# Patient Record
Sex: FEMALE | Race: WHITE | NOT HISPANIC OR LATINO
[De-identification: names, ages, dates, MRNs, and addresses within clinical notes are randomized per-mention and may not be internally consistent; named-entity substitution may affect disease eponyms.]

---

## 2017-05-03 ENCOUNTER — APPOINTMENT (OUTPATIENT)
Dept: ORTHOPEDIC SURGERY | Facility: CLINIC | Age: 72
End: 2017-05-03

## 2017-05-03 DIAGNOSIS — M17.0 BILATERAL PRIMARY OSTEOARTHRITIS OF KNEE: ICD-10-CM

## 2019-02-17 ENCOUNTER — EMERGENCY (EMERGENCY)
Facility: HOSPITAL | Age: 74
LOS: 1 days | Discharge: HOME | End: 2019-02-17
Attending: EMERGENCY MEDICINE | Admitting: EMERGENCY MEDICINE
Payer: MEDICARE

## 2019-02-17 VITALS
DIASTOLIC BLOOD PRESSURE: 88 MMHG | SYSTOLIC BLOOD PRESSURE: 145 MMHG | HEART RATE: 62 BPM | OXYGEN SATURATION: 100 % | RESPIRATION RATE: 16 BRPM

## 2019-02-17 VITALS
OXYGEN SATURATION: 99 % | HEART RATE: 107 BPM | RESPIRATION RATE: 18 BRPM | DIASTOLIC BLOOD PRESSURE: 106 MMHG | SYSTOLIC BLOOD PRESSURE: 160 MMHG | TEMPERATURE: 98 F

## 2019-02-17 PROCEDURE — 99283 EMERGENCY DEPT VISIT LOW MDM: CPT | Mod: GC

## 2019-02-17 RX ORDER — DIPHENHYDRAMINE HCL 50 MG
1 CAPSULE ORAL
Qty: 30 | Refills: 0
Start: 2019-02-17

## 2019-02-17 RX ORDER — DIPHENHYDRAMINE HCL 50 MG
50 CAPSULE ORAL ONCE
Qty: 0 | Refills: 0 | Status: COMPLETED | OUTPATIENT
Start: 2019-02-17 | End: 2019-02-17

## 2019-02-17 RX ORDER — FAMOTIDINE 10 MG/ML
20 INJECTION INTRAVENOUS DAILY
Qty: 0 | Refills: 0 | Status: DISCONTINUED | OUTPATIENT
Start: 2019-02-17 | End: 2019-02-21

## 2019-02-17 RX ADMIN — FAMOTIDINE 20 MILLIGRAM(S): 10 INJECTION INTRAVENOUS at 09:48

## 2019-02-17 RX ADMIN — Medication 50 MILLIGRAM(S): at 09:47

## 2019-02-17 NOTE — ED ADULT TRIAGE NOTE - CHIEF COMPLAINT QUOTE
pt amb to triage c/o facial swelling, allg to PCNS denies taking, able to complete sentences, no resp distress noted, swelling noted on presentation, denies taking ace inhibitors

## 2019-02-17 NOTE — ED PROVIDER NOTE - ENMT, MLM
Airway patent, Nasal mucosa clear. Mouth with normal mucosa. Throat has no vesicles, no oropharyngeal exudates and uvula is midline. + lower and b/l cheek swelling

## 2019-02-17 NOTE — ED PROVIDER NOTE - CLINICAL SUMMARY MEDICAL DECISION MAKING FREE TEXT BOX
73F hx reflux, presenting w/ facial and lower lip swelling since 5AM this morning, concern for allergic reaction, no known risk factors for angioedema, will tx as allergy and watch and if no improvement will consider ENT eval 73F hx reflux, presenting w/ facial and lower lip swelling since 5AM this morning, concern for allergic reaction, possible medication side effect, will tx as allergy and watch and if no improvement will consider ENT eval

## 2019-02-17 NOTE — ED ADULT NURSE NOTE - EENT WDL
+ facial and lip swelling, no tongue swelling, no redness, No tenderness or swelling to throat or neck.

## 2019-02-17 NOTE — ED ADULT NURSE NOTE - NSIMPLEMENTINTERV_GEN_ALL_ED
Implemented All Universal Safety Interventions:  [place] to call system. Call bell, personal items and telephone within reach. Instruct patient to call for assistance. Room bathroom lighting operational. Non-slip footwear when patient is off stretcher. Physically safe environment: no spills, clutter or unnecessary equipment. Stretcher in lowest position, wheels locked, appropriate side rails in place.

## 2019-02-17 NOTE — ED PROVIDER NOTE - NSFOLLOWUPINSTRUCTIONS_ED_ALL_ED_FT
Rest and plenty of fluids.  Continue Prednisone 40mg daily.  May take Benadryl 25mg every 6 hours as needed for swelling.  Follow up with Allergist as discussed.

## 2019-02-17 NOTE — ED ADULT NURSE NOTE - NSFALLRSKASSESSDT_ED_ALL_ED
Pharmacy calling stating script for muscle relaxant from UC visit not coming through yet, reviewed in epic and see it notes something about PA awaiting response? Gave verbal order as written in epic to pharmacist. No Triage required.    Jennifer Lennon RN, BSN  [facility]    
17-Feb-2019 12:40

## 2019-02-17 NOTE — ED PROVIDER NOTE - PSH
Bilateral Cataracts repair 7 yrs ago    H/O: Hysterectomy - 1997    History of Colonoscopy and endoscopy May 2011    right ankle ORIF - 2009

## 2019-02-17 NOTE — ED PROVIDER NOTE - PROGRESS NOTE DETAILS
Oh PGY2: pt reassessed, left sided facial swelling improved. stable condition, no new symptoms, will continue to monitor Dr. Wright: Pt states feeling better. Swelling improved. Tolerating PO with no difficulty breathing. Advised to f/u with Allergist and given list. Sent Prednisone and Benadryl to pharmacy.

## 2019-02-17 NOTE — ED ADULT NURSE NOTE - OBJECTIVE STATEMENT
73F hx reflux, presenting w/ facial and lower lip swelling since 5AM this morning, states she was normal yesterday. No new foods, allergens or exposures. No sick contacts. Pt denies any difficulty breathing. Denies pain or difficult swallowing or tongue swelling but did notice with the swelling of her face that it was hard to intake food. No chest pain, sob, nausea, vomiting. No fevers/ chills. No travel. No pruritis/ skin changes.

## 2019-02-17 NOTE — ED PROVIDER NOTE - ATTENDING CONTRIBUTION TO CARE
Pt was seen and evaluated by me. Pt is a 72 y/o female with PMHx of reflux who presented to the ED for facial swelling today. Pt states she had some wine and chicken last night and then woke up today at 5am with swelling to lower face and lips. Pt denies any pain. Denies any difficulty swallowing or SOB. Pt has since had coffee and a jelly donut without any difficulty. Pt denies any headache, fever, chills, nausea, vomiting, chest pain, or abd pain. Lungs CTA b/l. RRR. Abd soft, non-tender. No tongue swelling. Uvula midline. Noted to have lower facial and lower lip swelling.

## 2019-02-17 NOTE — ED PROVIDER NOTE - OBJECTIVE STATEMENT
73F hx reflux, presenting w/ facial and lower lip swelling since 5AM this morning, states she was normal yesterday. Denies any difficulty breathing. Denies pain or difficult swallowing or tongue swelling but did notice with the swelling of her face that it was hard to intake food. no chest pain, sob, nausea, vomiting 73F hx reflux, presenting w/ facial and lower lip swelling since 5AM this morning, states she was normal yesterday. No new foods, allergens or exposures. No sick contacts. Pt denies any difficulty breathing. Denies pain or difficult swallowing or tongue swelling but did notice with the swelling of her face that it was hard to intake food. No chest pain, sob, nausea, vomiting. No fevers/ chills. No travel. No pruritis/ skin changes. 73F hx reflux, presenting w/ facial and lower lip swelling since 5AM this morning, states she was normal yesterday. No new foods, allergens or exposures. No sick contacts. Pt denies any difficulty breathing. Denies pain or difficult swallowing or tongue swelling but did notice with the swelling of her face that it was hard to intake food. No chest pain, sob, nausea, vomiting. No fevers/ chills. No travel. No pruritis/ skin changes. Home meds include denosumab and rabeprazole (not new meds)

## 2020-03-22 ENCOUNTER — INPATIENT (INPATIENT)
Facility: HOSPITAL | Age: 75
LOS: 1 days | Discharge: NURSING, REHAB OR LONG-TERM CARE | End: 2020-03-24
Attending: ORTHOPAEDIC SURGERY | Admitting: ORTHOPAEDIC SURGERY
Payer: MEDICARE

## 2020-03-22 ENCOUNTER — TRANSCRIPTION ENCOUNTER (OUTPATIENT)
Age: 75
End: 2020-03-22

## 2020-03-22 VITALS
TEMPERATURE: 99 F | RESPIRATION RATE: 18 BRPM | HEART RATE: 74 BPM | OXYGEN SATURATION: 99 % | DIASTOLIC BLOOD PRESSURE: 89 MMHG | SYSTOLIC BLOOD PRESSURE: 172 MMHG

## 2020-03-22 DIAGNOSIS — Z01.818 ENCOUNTER FOR OTHER PREPROCEDURAL EXAMINATION: ICD-10-CM

## 2020-03-22 DIAGNOSIS — S72.002A FRACTURE OF UNSPECIFIED PART OF NECK OF LEFT FEMUR, INITIAL ENCOUNTER FOR CLOSED FRACTURE: ICD-10-CM

## 2020-03-22 LAB
ALBUMIN SERPL ELPH-MCNC: 4.1 G/DL — SIGNIFICANT CHANGE UP (ref 3.3–5)
ALP SERPL-CCNC: 86 U/L — SIGNIFICANT CHANGE UP (ref 40–120)
ALT FLD-CCNC: 17 U/L — SIGNIFICANT CHANGE UP (ref 4–33)
ANION GAP SERPL CALC-SCNC: 12 MMO/L — SIGNIFICANT CHANGE UP (ref 7–14)
APPEARANCE UR: CLEAR — SIGNIFICANT CHANGE UP
APTT BLD: 31.6 SEC — SIGNIFICANT CHANGE UP (ref 27.5–36.3)
AST SERPL-CCNC: 20 U/L — SIGNIFICANT CHANGE UP (ref 4–32)
BACTERIA # UR AUTO: HIGH
BILIRUB SERPL-MCNC: 0.3 MG/DL — SIGNIFICANT CHANGE UP (ref 0.2–1.2)
BILIRUB UR-MCNC: NEGATIVE — SIGNIFICANT CHANGE UP
BLD GP AB SCN SERPL QL: NEGATIVE — SIGNIFICANT CHANGE UP
BLOOD UR QL VISUAL: HIGH
BUN SERPL-MCNC: 20 MG/DL — SIGNIFICANT CHANGE UP (ref 7–23)
CALCIUM SERPL-MCNC: 9.6 MG/DL — SIGNIFICANT CHANGE UP (ref 8.4–10.5)
CHLORIDE SERPL-SCNC: 98 MMOL/L — SIGNIFICANT CHANGE UP (ref 98–107)
CO2 SERPL-SCNC: 24 MMOL/L — SIGNIFICANT CHANGE UP (ref 22–31)
COLOR SPEC: YELLOW — SIGNIFICANT CHANGE UP
CREAT SERPL-MCNC: 0.59 MG/DL — SIGNIFICANT CHANGE UP (ref 0.5–1.3)
GLUCOSE SERPL-MCNC: 97 MG/DL — SIGNIFICANT CHANGE UP (ref 70–99)
GLUCOSE UR-MCNC: NEGATIVE — SIGNIFICANT CHANGE UP
HCT VFR BLD CALC: 43.8 % — SIGNIFICANT CHANGE UP (ref 34.5–45)
HGB BLD-MCNC: 14.3 G/DL — SIGNIFICANT CHANGE UP (ref 11.5–15.5)
HYALINE CASTS # UR AUTO: NEGATIVE — SIGNIFICANT CHANGE UP
INR BLD: 1.03 — SIGNIFICANT CHANGE UP (ref 0.88–1.17)
KETONES UR-MCNC: SIGNIFICANT CHANGE UP
LEUKOCYTE ESTERASE UR-ACNC: SIGNIFICANT CHANGE UP
MCHC RBC-ENTMCNC: 29.3 PG — SIGNIFICANT CHANGE UP (ref 27–34)
MCHC RBC-ENTMCNC: 32.6 % — SIGNIFICANT CHANGE UP (ref 32–36)
MCV RBC AUTO: 89.8 FL — SIGNIFICANT CHANGE UP (ref 80–100)
NITRITE UR-MCNC: POSITIVE — HIGH
NRBC # FLD: 0 K/UL — SIGNIFICANT CHANGE UP (ref 0–0)
PH UR: 5.5 — SIGNIFICANT CHANGE UP (ref 5–8)
PLATELET # BLD AUTO: 285 K/UL — SIGNIFICANT CHANGE UP (ref 150–400)
PMV BLD: 10.8 FL — SIGNIFICANT CHANGE UP (ref 7–13)
POTASSIUM SERPL-MCNC: 4.2 MMOL/L — SIGNIFICANT CHANGE UP (ref 3.5–5.3)
POTASSIUM SERPL-SCNC: 4.2 MMOL/L — SIGNIFICANT CHANGE UP (ref 3.5–5.3)
PROT SERPL-MCNC: 7.9 G/DL — SIGNIFICANT CHANGE UP (ref 6–8.3)
PROT UR-MCNC: 10 — SIGNIFICANT CHANGE UP
PROTHROM AB SERPL-ACNC: 11.8 SEC — SIGNIFICANT CHANGE UP (ref 9.8–13.1)
RBC # BLD: 4.88 M/UL — SIGNIFICANT CHANGE UP (ref 3.8–5.2)
RBC # FLD: 13.2 % — SIGNIFICANT CHANGE UP (ref 10.3–14.5)
RBC CASTS # UR COMP ASSIST: HIGH (ref 0–?)
RH IG SCN BLD-IMP: POSITIVE — SIGNIFICANT CHANGE UP
SODIUM SERPL-SCNC: 134 MMOL/L — LOW (ref 135–145)
SP GR SPEC: 1.02 — SIGNIFICANT CHANGE UP (ref 1–1.04)
SQUAMOUS # UR AUTO: SIGNIFICANT CHANGE UP
UROBILINOGEN FLD QL: NORMAL — SIGNIFICANT CHANGE UP
WBC # BLD: 8.02 K/UL — SIGNIFICANT CHANGE UP (ref 3.8–10.5)
WBC # FLD AUTO: 8.02 K/UL — SIGNIFICANT CHANGE UP (ref 3.8–10.5)
WBC UR QL: HIGH (ref 0–?)

## 2020-03-22 PROCEDURE — 99221 1ST HOSP IP/OBS SF/LOW 40: CPT

## 2020-03-22 PROCEDURE — 73502 X-RAY EXAM HIP UNI 2-3 VIEWS: CPT | Mod: 26,LT

## 2020-03-22 PROCEDURE — 73501 X-RAY EXAM HIP UNI 1 VIEW: CPT | Mod: 26,59,LT

## 2020-03-22 PROCEDURE — 71046 X-RAY EXAM CHEST 2 VIEWS: CPT | Mod: 26

## 2020-03-22 PROCEDURE — 73552 X-RAY EXAM OF FEMUR 2/>: CPT | Mod: 26,LT

## 2020-03-22 RX ORDER — MAGNESIUM HYDROXIDE 400 MG/1
30 TABLET, CHEWABLE ORAL DAILY
Refills: 0 | Status: DISCONTINUED | OUTPATIENT
Start: 2020-03-22 | End: 2020-03-24

## 2020-03-22 RX ORDER — TETANUS TOXOID, REDUCED DIPHTHERIA TOXOID AND ACELLULAR PERTUSSIS VACCINE, ADSORBED 5; 2.5; 8; 8; 2.5 [IU]/.5ML; [IU]/.5ML; UG/.5ML; UG/.5ML; UG/.5ML
0.5 SUSPENSION INTRAMUSCULAR ONCE
Refills: 0 | Status: COMPLETED | OUTPATIENT
Start: 2020-03-22 | End: 2020-03-22

## 2020-03-22 RX ORDER — PANTOPRAZOLE SODIUM 20 MG/1
40 TABLET, DELAYED RELEASE ORAL
Refills: 0 | Status: DISCONTINUED | OUTPATIENT
Start: 2020-03-22 | End: 2020-03-24

## 2020-03-22 RX ORDER — HEPARIN SODIUM 5000 [USP'U]/ML
5000 INJECTION INTRAVENOUS; SUBCUTANEOUS EVERY 8 HOURS
Refills: 0 | Status: DISCONTINUED | OUTPATIENT
Start: 2020-03-22 | End: 2020-03-23

## 2020-03-22 RX ORDER — OXYCODONE HYDROCHLORIDE 5 MG/1
2.5 TABLET ORAL EVERY 4 HOURS
Refills: 0 | Status: DISCONTINUED | OUTPATIENT
Start: 2020-03-22 | End: 2020-03-24

## 2020-03-22 RX ORDER — RABEPRAZOLE 20 MG/1
1 TABLET, DELAYED RELEASE ORAL
Qty: 0 | Refills: 0 | DISCHARGE

## 2020-03-22 RX ORDER — LANOLIN ALCOHOL/MO/W.PET/CERES
3 CREAM (GRAM) TOPICAL AT BEDTIME
Refills: 0 | Status: DISCONTINUED | OUTPATIENT
Start: 2020-03-22 | End: 2020-03-24

## 2020-03-22 RX ORDER — POVIDONE-IODINE 5 %
1 AEROSOL (ML) TOPICAL ONCE
Refills: 0 | Status: COMPLETED | OUTPATIENT
Start: 2020-03-22 | End: 2020-03-23

## 2020-03-22 RX ORDER — OXYCODONE HYDROCHLORIDE 5 MG/1
5 TABLET ORAL EVERY 4 HOURS
Refills: 0 | Status: DISCONTINUED | OUTPATIENT
Start: 2020-03-22 | End: 2020-03-24

## 2020-03-22 RX ORDER — SENNA PLUS 8.6 MG/1
2 TABLET ORAL AT BEDTIME
Refills: 0 | Status: DISCONTINUED | OUTPATIENT
Start: 2020-03-22 | End: 2020-03-24

## 2020-03-22 RX ORDER — CHLORHEXIDINE GLUCONATE 213 G/1000ML
1 SOLUTION TOPICAL ONCE
Refills: 0 | Status: COMPLETED | OUTPATIENT
Start: 2020-03-22 | End: 2020-03-23

## 2020-03-22 RX ORDER — SODIUM CHLORIDE 9 MG/ML
1000 INJECTION INTRAMUSCULAR; INTRAVENOUS; SUBCUTANEOUS
Refills: 0 | Status: DISCONTINUED | OUTPATIENT
Start: 2020-03-22 | End: 2020-03-23

## 2020-03-22 RX ORDER — ACETAMINOPHEN 500 MG
975 TABLET ORAL EVERY 8 HOURS
Refills: 0 | Status: DISCONTINUED | OUTPATIENT
Start: 2020-03-22 | End: 2020-03-24

## 2020-03-22 RX ORDER — MORPHINE SULFATE 50 MG/1
2 CAPSULE, EXTENDED RELEASE ORAL ONCE
Refills: 0 | Status: DISCONTINUED | OUTPATIENT
Start: 2020-03-22 | End: 2020-03-22

## 2020-03-22 RX ORDER — IBANDRONATE SODIUM 150 MG/1
1 TABLET ORAL
Qty: 0 | Refills: 0 | DISCHARGE

## 2020-03-22 RX ADMIN — Medication 975 MILLIGRAM(S): at 21:58

## 2020-03-22 RX ADMIN — OXYCODONE HYDROCHLORIDE 5 MILLIGRAM(S): 5 TABLET ORAL at 16:12

## 2020-03-22 RX ADMIN — MORPHINE SULFATE 2 MILLIGRAM(S): 50 CAPSULE, EXTENDED RELEASE ORAL at 11:15

## 2020-03-22 RX ADMIN — HEPARIN SODIUM 5000 UNIT(S): 5000 INJECTION INTRAVENOUS; SUBCUTANEOUS at 22:01

## 2020-03-22 RX ADMIN — SENNA PLUS 2 TABLET(S): 8.6 TABLET ORAL at 21:59

## 2020-03-22 RX ADMIN — Medication 3 MILLIGRAM(S): at 22:00

## 2020-03-22 RX ADMIN — SODIUM CHLORIDE 125 MILLILITER(S): 9 INJECTION INTRAMUSCULAR; INTRAVENOUS; SUBCUTANEOUS at 22:11

## 2020-03-22 RX ADMIN — OXYCODONE HYDROCHLORIDE 5 MILLIGRAM(S): 5 TABLET ORAL at 16:42

## 2020-03-22 RX ADMIN — TETANUS TOXOID, REDUCED DIPHTHERIA TOXOID AND ACELLULAR PERTUSSIS VACCINE, ADSORBED 0.5 MILLILITER(S): 5; 2.5; 8; 8; 2.5 SUSPENSION INTRAMUSCULAR at 11:05

## 2020-03-22 RX ADMIN — Medication 975 MILLIGRAM(S): at 22:58

## 2020-03-22 NOTE — CONSULT NOTE ADULT - PROBLEM SELECTOR RECOMMENDATION 9
- Planned for OR tomorrow for PARMJIT.   - Vitals: reviewed. Stable.   - EKG: reviewed. No ST changes/elevations/depressions. Occasional PCVs.  - Labs: reviewed, renal function wnl, no leukocytosis. Na 132.   - Imaging: reviewed, CXR did not show any acute pulmonary process.   - No prior history of any cardiopulmonary disease, CVA, in the past.   - No prior history of anticoagulation use or ASA use.   - No prior history of DM, Glucose 97  - DVT ppx Heparin SQ as patient is high risk.  - Ensure bowel regimen.   - Revised Cardiac Risk Index 0:  3.9% 30 day risk of death MI or cardiac arrest.   - Good functional METS.  - Patient is low-intermediate risk for an intermediate risk (ortho) procedure.  - Patient is medically optimized for procedure. - Planned for OR tomorrow for PARMJIT.   - Vitals: reviewed. Stable.   - EKG: reviewed. NSR, +PVCs, No ST changes/elevations/depressions.   - Labs: reviewed, renal function wnl, no leukocytosis. Na 132.   - Imaging: reviewed, CXR did not show any acute pulmonary process.   - No prior history of any cardiopulmonary disease, CVA, in the past.   - No prior history of anticoagulation use or ASA use.   - No prior history of DM, Glucose 97  - DVT ppx Heparin SQ as patient is high risk.  - Ensure bowel regimen.   - Revised Cardiac Risk Index 0:  3.9% 30 day risk of death MI or cardiac arrest.   - Good functional METS.  - Patient is low-intermediate risk for an intermediate risk (ortho) procedure.  - Patient is medically optimized for procedure.

## 2020-03-22 NOTE — ED ADULT TRIAGE NOTE - CHIEF COMPLAINT QUOTE
Pt brought in by EMS from the [facility], pt slipped and fell landing on her L hip. Pt complaining of L hip pain and shortening. pt denies hitting her head, no use of blood thinners.

## 2020-03-22 NOTE — H&P ADULT - NSICDXPASTSURGICALHX_GEN_ALL_CORE_FT
PAST SURGICAL HISTORY:  Bilateral Cataracts repair 7 yrs ago     H/O: Hysterectomy - 1997     History of Colonoscopy and endoscopy May 2011     right ankle ORIF - 2009

## 2020-03-22 NOTE — ED ADULT NURSE NOTE - NSIMPLEMENTINTERV_GEN_ALL_ED
Implemented All Fall Risk Interventions:  [place] to call system. Call bell, personal items and telephone within reach. Instruct patient to call for assistance. Room bathroom lighting operational. Non-slip footwear when patient is off stretcher. Physically safe environment: no spills, clutter or unnecessary equipment. Stretcher in lowest position, wheels locked, appropriate side rails in place. Provide visual cue, wrist band, yellow gown, etc. Monitor gait and stability. Monitor for mental status changes and reorient to person, place, and time. Review medications for side effects contributing to fall risk. Reinforce activity limits and safety measures with patient and family.

## 2020-03-22 NOTE — H&P ADULT - ASSESSMENT
74yFemale with Left Femoral Neck Fracture    - Pain control  - IS  - Continue home medications  - Regular Diet, NPOpMN/IVF  - CBC/BMP/Coags/UA/T+S x2  - EKG/CXR  -  Medical clearance prior to planned procedure  - Plan for OR 3/23 for PARMJIT

## 2020-03-22 NOTE — ED PROVIDER NOTE - OBJECTIVE STATEMENT
73 yo F with GERD arrived with mechanical fall landing on to her L hip.  Pt reports feeling like she tripped.  Noted to land on her left side and L leg is externally rotated and shortened.  Offered pain meds, initially declined.  Also sustained 2 small abrasions on to L anterior shin.  Unknown last Tdap.  Denies head trauma, nausea, or vomiting, neck pain.    No fever/chills, No photophobia/eye pain/changes in vision, No ear pain/sore throat/dysphagia, No chest pain/palpitations, no SOB/cough/wheeze/stridor, No abd pain, No N/V/D, no dysuria/frequency/discharge, No neck/back pain, no rash, no changes in neurological status/function.

## 2020-03-22 NOTE — ED PROVIDER NOTE - MUSCULOSKELETAL MINIMAL EXAM
L hip externally rotated and shortened, pulses dp and pt 2+ distally.  TTP over L hip, no asis pain, pelvis stable.

## 2020-03-22 NOTE — ED PROVIDER NOTE - CLINICAL SUMMARY MEDICAL DECISION MAKING FREE TEXT BOX
73 yo F with GERD arrived with mechanical fall landing on to her L hip concern for L hip fracture, accepted pain meds, morphine 2mg IVP, xray hip, pre-op labs, likely admit.  [facility].

## 2020-03-22 NOTE — H&P ADULT - HISTORY OF PRESENT ILLNESS
74yFemale c/o L hip pain s/p mechanical fall earlier today. Patient denies head hit or LOC. Patient denies numbness or tingling in the LLE. Patient denies any other injuries. Denies CP/palpitations    ROS: 10 point review of systems otherwise negative unless noted in HPI

## 2020-03-22 NOTE — ED ADULT NURSE NOTE - OBJECTIVE STATEMENT
pt received in RW zone 10. pt A+Ox4, s/p slip and fall today in [employer]. c/o left hip pain. denies head injury no loc. not on any blood thinners. respirations even and unlabored. labs sent. IVSL in place. refusing pain meds at this time. will continue to monitor/

## 2020-03-22 NOTE — H&P ADULT - NSHPPHYSICALEXAM_GEN_ALL_CORE
PE  Gen: NAD, alert and oriented  Resp: Unlabored breathing  LLE: Skin intact, no ecchymosis,        SILT DP/SP/ Alo/Saph/Post Tib       +EHL/FHL/TA/Gastroc,        Knee/ankle painless ROM,        hip ROM limited 2/2 pain,       DP+,        soft compartments, no calf ttp,        +log roll.      Secondary:  No TTP over bony landmarks, SILT BL, ROM intact BL, distal pulses palpable.    Imaging:  XR demonstrating L Displaced Femoral neck fracture

## 2020-03-22 NOTE — H&P ADULT - NSICDXPASTMEDICALHX_GEN_ALL_CORE_FT
PAST MEDICAL HISTORY:  Bilateral Cataracts     GERD (Gastroesophageal Reflux Disease)     Osteopenia

## 2020-03-22 NOTE — H&P ADULT - NSHPLABSRESULTS_GEN_ALL_CORE
Meds: See med rec    T(C): 37.1 (03-22-20 @ 10:15)  HR: 74 (03-22-20 @ 10:15)  BP: 172/89 (03-22-20 @ 10:15)  RR: 18 (03-22-20 @ 10:15)  SpO2: 99% (03-22-20 @ 10:15)  Wt(kg): --                        14.3   8.02  )-----------( 285      ( 22 Mar 2020 11:11 )             43.8     03-22    134<L>  |  98  |  20  ----------------------------<  97  4.2   |  24  |  0.59    Ca    9.6      22 Mar 2020 11:11    TPro  7.9  /  Alb  4.1  /  TBili  0.3  /  DBili  x   /  AST  20  /  ALT  17  /  AlkPhos  86  03-22    PT/INR - ( 22 Mar 2020 11:11 )   PT: 11.8 SEC;   INR: 1.03          PTT - ( 22 Mar 2020 11:11 )  PTT:31.6 SEC
Grossly Intact

## 2020-03-22 NOTE — PATIENT PROFILE ADULT - MONEY FOR FOOD
Dr. Colbert stopped at bedside to discuss his concerns with the patient regarding his recent weight gain.  The procedure will be cancelled due to the risk of complications, considering his current BMI.  Patient and wife agree to follow up in the office to determine options going forward.     no

## 2020-03-23 ENCOUNTER — RESULT REVIEW (OUTPATIENT)
Age: 75
End: 2020-03-23

## 2020-03-23 LAB
24R-OH-CALCIDIOL SERPL-MCNC: 14.5 NG/ML — LOW (ref 30–80)
ALBUMIN SERPL ELPH-MCNC: 3.4 G/DL — SIGNIFICANT CHANGE UP (ref 3.3–5)
ANION GAP SERPL CALC-SCNC: 10 MMO/L — SIGNIFICANT CHANGE UP (ref 7–14)
ANION GAP SERPL CALC-SCNC: 11 MMO/L — SIGNIFICANT CHANGE UP (ref 7–14)
APTT BLD: 31.3 SEC — SIGNIFICANT CHANGE UP (ref 27.5–36.3)
BASOPHILS # BLD AUTO: 0.02 K/UL — SIGNIFICANT CHANGE UP (ref 0–0.2)
BASOPHILS NFR BLD AUTO: 0.3 % — SIGNIFICANT CHANGE UP (ref 0–2)
BLD GP AB SCN SERPL QL: NEGATIVE — SIGNIFICANT CHANGE UP
BUN SERPL-MCNC: 11 MG/DL — SIGNIFICANT CHANGE UP (ref 7–23)
BUN SERPL-MCNC: 9 MG/DL — SIGNIFICANT CHANGE UP (ref 7–23)
CALCIUM SERPL-MCNC: 8.3 MG/DL — LOW (ref 8.4–10.5)
CALCIUM SERPL-MCNC: 8.6 MG/DL — SIGNIFICANT CHANGE UP (ref 8.4–10.5)
CHLORIDE SERPL-SCNC: 104 MMOL/L — SIGNIFICANT CHANGE UP (ref 98–107)
CHLORIDE SERPL-SCNC: 109 MMOL/L — HIGH (ref 98–107)
CO2 SERPL-SCNC: 22 MMOL/L — SIGNIFICANT CHANGE UP (ref 22–31)
CO2 SERPL-SCNC: 23 MMOL/L — SIGNIFICANT CHANGE UP (ref 22–31)
CREAT SERPL-MCNC: 0.49 MG/DL — LOW (ref 0.5–1.3)
CREAT SERPL-MCNC: 0.54 MG/DL — SIGNIFICANT CHANGE UP (ref 0.5–1.3)
EOSINOPHIL # BLD AUTO: 0.14 K/UL — SIGNIFICANT CHANGE UP (ref 0–0.5)
EOSINOPHIL NFR BLD AUTO: 1.8 % — SIGNIFICANT CHANGE UP (ref 0–6)
GLUCOSE SERPL-MCNC: 95 MG/DL — SIGNIFICANT CHANGE UP (ref 70–99)
GLUCOSE SERPL-MCNC: 98 MG/DL — SIGNIFICANT CHANGE UP (ref 70–99)
HCT VFR BLD CALC: 35.5 % — SIGNIFICANT CHANGE UP (ref 34.5–45)
HCT VFR BLD CALC: 38.3 % — SIGNIFICANT CHANGE UP (ref 34.5–45)
HGB BLD-MCNC: 11.7 G/DL — SIGNIFICANT CHANGE UP (ref 11.5–15.5)
HGB BLD-MCNC: 12.5 G/DL — SIGNIFICANT CHANGE UP (ref 11.5–15.5)
IMM GRANULOCYTES NFR BLD AUTO: 0.4 % — SIGNIFICANT CHANGE UP (ref 0–1.5)
INR BLD: 1.16 — SIGNIFICANT CHANGE UP (ref 0.88–1.17)
LYMPHOCYTES # BLD AUTO: 1.17 K/UL — SIGNIFICANT CHANGE UP (ref 1–3.3)
LYMPHOCYTES # BLD AUTO: 15.4 % — SIGNIFICANT CHANGE UP (ref 13–44)
MCHC RBC-ENTMCNC: 29.1 PG — SIGNIFICANT CHANGE UP (ref 27–34)
MCHC RBC-ENTMCNC: 29.2 PG — SIGNIFICANT CHANGE UP (ref 27–34)
MCHC RBC-ENTMCNC: 32.6 % — SIGNIFICANT CHANGE UP (ref 32–36)
MCHC RBC-ENTMCNC: 33 % — SIGNIFICANT CHANGE UP (ref 32–36)
MCV RBC AUTO: 88.3 FL — SIGNIFICANT CHANGE UP (ref 80–100)
MCV RBC AUTO: 89.5 FL — SIGNIFICANT CHANGE UP (ref 80–100)
MONOCYTES # BLD AUTO: 0.64 K/UL — SIGNIFICANT CHANGE UP (ref 0–0.9)
MONOCYTES NFR BLD AUTO: 8.4 % — SIGNIFICANT CHANGE UP (ref 2–14)
NEUTROPHILS # BLD AUTO: 5.58 K/UL — SIGNIFICANT CHANGE UP (ref 1.8–7.4)
NEUTROPHILS NFR BLD AUTO: 73.7 % — SIGNIFICANT CHANGE UP (ref 43–77)
NRBC # FLD: 0 K/UL — SIGNIFICANT CHANGE UP (ref 0–0)
NRBC # FLD: 0 K/UL — SIGNIFICANT CHANGE UP (ref 0–0)
PLATELET # BLD AUTO: 215 K/UL — SIGNIFICANT CHANGE UP (ref 150–400)
PLATELET # BLD AUTO: 224 K/UL — SIGNIFICANT CHANGE UP (ref 150–400)
PMV BLD: 10.6 FL — SIGNIFICANT CHANGE UP (ref 7–13)
PMV BLD: 10.7 FL — SIGNIFICANT CHANGE UP (ref 7–13)
POTASSIUM SERPL-MCNC: 3.5 MMOL/L — SIGNIFICANT CHANGE UP (ref 3.5–5.3)
POTASSIUM SERPL-MCNC: 3.7 MMOL/L — SIGNIFICANT CHANGE UP (ref 3.5–5.3)
POTASSIUM SERPL-SCNC: 3.5 MMOL/L — SIGNIFICANT CHANGE UP (ref 3.5–5.3)
POTASSIUM SERPL-SCNC: 3.7 MMOL/L — SIGNIFICANT CHANGE UP (ref 3.5–5.3)
PROTHROM AB SERPL-ACNC: 13.4 SEC — HIGH (ref 9.8–13.1)
RBC # BLD: 4.02 M/UL — SIGNIFICANT CHANGE UP (ref 3.8–5.2)
RBC # BLD: 4.28 M/UL — SIGNIFICANT CHANGE UP (ref 3.8–5.2)
RBC # FLD: 13.2 % — SIGNIFICANT CHANGE UP (ref 10.3–14.5)
RBC # FLD: 13.3 % — SIGNIFICANT CHANGE UP (ref 10.3–14.5)
RH IG SCN BLD-IMP: POSITIVE — SIGNIFICANT CHANGE UP
SODIUM SERPL-SCNC: 137 MMOL/L — SIGNIFICANT CHANGE UP (ref 135–145)
SODIUM SERPL-SCNC: 142 MMOL/L — SIGNIFICANT CHANGE UP (ref 135–145)
WBC # BLD: 11.52 K/UL — HIGH (ref 3.8–10.5)
WBC # BLD: 7.58 K/UL — SIGNIFICANT CHANGE UP (ref 3.8–10.5)
WBC # FLD AUTO: 11.52 K/UL — HIGH (ref 3.8–10.5)
WBC # FLD AUTO: 7.58 K/UL — SIGNIFICANT CHANGE UP (ref 3.8–10.5)

## 2020-03-23 PROCEDURE — 73501 X-RAY EXAM HIP UNI 1 VIEW: CPT | Mod: 26,LT

## 2020-03-23 PROCEDURE — 27130 TOTAL HIP ARTHROPLASTY: CPT | Mod: LT

## 2020-03-23 PROCEDURE — 88311 DECALCIFY TISSUE: CPT | Mod: 26

## 2020-03-23 PROCEDURE — 88305 TISSUE EXAM BY PATHOLOGIST: CPT | Mod: 26

## 2020-03-23 RX ORDER — ONDANSETRON 8 MG/1
4 TABLET, FILM COATED ORAL EVERY 6 HOURS
Refills: 0 | Status: DISCONTINUED | OUTPATIENT
Start: 2020-03-23 | End: 2020-03-24

## 2020-03-23 RX ORDER — CEFAZOLIN SODIUM 1 G
2000 VIAL (EA) INJECTION EVERY 8 HOURS
Refills: 0 | Status: COMPLETED | OUTPATIENT
Start: 2020-03-23 | End: 2020-03-24

## 2020-03-23 RX ORDER — SENNA PLUS 8.6 MG/1
2 TABLET ORAL AT BEDTIME
Refills: 0 | Status: DISCONTINUED | OUTPATIENT
Start: 2020-03-23 | End: 2020-03-24

## 2020-03-23 RX ORDER — SODIUM CHLORIDE 9 MG/ML
500 INJECTION INTRAMUSCULAR; INTRAVENOUS; SUBCUTANEOUS ONCE
Refills: 0 | Status: COMPLETED | OUTPATIENT
Start: 2020-03-24 | End: 2020-03-24

## 2020-03-23 RX ORDER — ASPIRIN/CALCIUM CARB/MAGNESIUM 324 MG
81 TABLET ORAL
Refills: 0 | Status: DISCONTINUED | OUTPATIENT
Start: 2020-03-23 | End: 2020-03-24

## 2020-03-23 RX ORDER — SODIUM CHLORIDE 9 MG/ML
1000 INJECTION, SOLUTION INTRAVENOUS
Refills: 0 | Status: DISCONTINUED | OUTPATIENT
Start: 2020-03-23 | End: 2020-03-24

## 2020-03-23 RX ORDER — SODIUM CHLORIDE 9 MG/ML
500 INJECTION INTRAMUSCULAR; INTRAVENOUS; SUBCUTANEOUS
Refills: 0 | Status: DISCONTINUED | OUTPATIENT
Start: 2020-03-23 | End: 2020-03-24

## 2020-03-23 RX ORDER — POLYETHYLENE GLYCOL 3350 17 G/17G
17 POWDER, FOR SOLUTION ORAL DAILY
Refills: 0 | Status: DISCONTINUED | OUTPATIENT
Start: 2020-03-23 | End: 2020-03-24

## 2020-03-23 RX ORDER — ASPIRIN/CALCIUM CARB/MAGNESIUM 324 MG
1 TABLET ORAL
Qty: 0 | Refills: 0 | DISCHARGE
Start: 2020-03-23 | End: 2020-04-23

## 2020-03-23 RX ORDER — PANTOPRAZOLE SODIUM 20 MG/1
40 TABLET, DELAYED RELEASE ORAL
Refills: 0 | Status: DISCONTINUED | OUTPATIENT
Start: 2020-03-23 | End: 2020-03-24

## 2020-03-23 RX ORDER — MAGNESIUM HYDROXIDE 400 MG/1
30 TABLET, CHEWABLE ORAL DAILY
Refills: 0 | Status: DISCONTINUED | OUTPATIENT
Start: 2020-03-23 | End: 2020-03-24

## 2020-03-23 RX ADMIN — SENNA PLUS 2 TABLET(S): 8.6 TABLET ORAL at 21:50

## 2020-03-23 RX ADMIN — SODIUM CHLORIDE 125 MILLILITER(S): 9 INJECTION INTRAMUSCULAR; INTRAVENOUS; SUBCUTANEOUS at 08:42

## 2020-03-23 RX ADMIN — Medication 975 MILLIGRAM(S): at 14:20

## 2020-03-23 RX ADMIN — OXYCODONE HYDROCHLORIDE 5 MILLIGRAM(S): 5 TABLET ORAL at 01:27

## 2020-03-23 RX ADMIN — HEPARIN SODIUM 5000 UNIT(S): 5000 INJECTION INTRAVENOUS; SUBCUTANEOUS at 05:34

## 2020-03-23 RX ADMIN — CHLORHEXIDINE GLUCONATE 1 APPLICATION(S): 213 SOLUTION TOPICAL at 08:41

## 2020-03-23 RX ADMIN — OXYCODONE HYDROCHLORIDE 5 MILLIGRAM(S): 5 TABLET ORAL at 02:19

## 2020-03-23 RX ADMIN — SODIUM CHLORIDE 150 MILLILITER(S): 9 INJECTION, SOLUTION INTRAVENOUS at 13:10

## 2020-03-23 RX ADMIN — Medication 975 MILLIGRAM(S): at 05:33

## 2020-03-23 RX ADMIN — Medication 975 MILLIGRAM(S): at 21:50

## 2020-03-23 RX ADMIN — Medication 1 APPLICATION(S): at 08:43

## 2020-03-23 RX ADMIN — Medication 100 MILLIGRAM(S): at 20:02

## 2020-03-23 RX ADMIN — Medication 975 MILLIGRAM(S): at 13:29

## 2020-03-23 RX ADMIN — SODIUM CHLORIDE 500 MILLILITER(S): 9 INJECTION INTRAMUSCULAR; INTRAVENOUS; SUBCUTANEOUS at 15:33

## 2020-03-23 NOTE — PHYSICAL THERAPY INITIAL EVALUATION ADULT - PERTINENT HX OF CURRENT PROBLEM, REHAB EVAL
74yFemale c/o Left hip pain s/p mechanical fall earlier today. Patient denies head hit or LOC. Patient denies numbness or tingling in the Left LE. Patient denies any other injuries. Denies CP/palpitations Pt is a 74 year old female who presented to [facility] on 3/22/2020 s/p fall and Left LE pain. Xray of Left Femur on 3/22/2020 displayed displaced left hip intertrochanteric fracture. Pt is now s/p Total left hip arthroplasty on 3/23/2020

## 2020-03-23 NOTE — PHYSICAL THERAPY INITIAL EVALUATION ADULT - PATIENT PROFILE REVIEW, REHAB EVAL
ACTIVITY: Ambulate as Tolerated/ Ambulate with Walker/ OOB to chair; spoke with RN prior to PT evaluation--> Pt OK for PT consult/OOB activity/yes ACTIVITY: Ambulate as Tolerated/ Ambulate with Walker/ OOB to chair; spoke with RN Melly Sun prior to PT evaluation--> Pt OK for PT consult/OOB activity/yes

## 2020-03-23 NOTE — OCCUPATIONAL THERAPY INITIAL EVALUATION ADULT - MD ORDER
Occupational Therapy (OT) to evaluate and treat. Occupational Therapy (OT) to evaluate and treat. Out of Bed to Chair. Ambulate as Tolerated. Ambulate with walker. Per JOHN Pickard, pt is okay to participate in OT evaluation and perform activity as tolerated.

## 2020-03-23 NOTE — PHYSICAL THERAPY INITIAL EVALUATION ADULT - DIAGNOSIS, PT EVAL
Pt s/p Left Total Hip Replacement; Pt s/p Left Total Hip Replacement on 03/23/2020; pt presents with decreased strength and decreased balance.

## 2020-03-23 NOTE — PHYSICAL THERAPY INITIAL EVALUATION ADULT - ADDITIONAL COMMENTS
Pt reports that she lives in a private house with her sister with ~2-3 steps to enter; (+)bilateral handrails; and a flight of stairs to negotiate to bedroom; (+)handrail. Prior to hospital admission pt was completely independent and used no assistive device with ambulation. Pt does own a single axis cane @ home if she needs one.    Pt left comfortable in bed, NAD, all lines intact, all precautions maintained, with call bell in reach, and RN aware of PT evaluation.

## 2020-03-23 NOTE — PHYSICAL THERAPY INITIAL EVALUATION ADULT - PASSIVE RANGE OF MOTION EXAMINATION, REHAB EVAL
*left hip flexion 0-85 degrees to maintain hip precautions/bilateral upper extremity Passive ROM was WFL (within functional limits)/bilateral lower extremity Passive ROM was WFL (within functional limits)

## 2020-03-23 NOTE — PROGRESS NOTE ADULT - SUBJECTIVE AND OBJECTIVE BOX
Ortho Progress Note    S: Patient seen and examined. No acute events overnight. Pain well controlled with current regimen. Denies lightheadedness/dizziness, CP/SOB.       O:  Physical Exam:  Gen: Laying in bed, NAD, alert and oriented.   Resp: Unlabored breathing  Ext: EHL/FHL/TA/Sol intact          + SILT DP/SP/INIGUEZ/Sa/Tib          +DP, extremity WWP    Vital Signs Last 24 Hrs  T(C): 36.6 (23 Mar 2020 05:19), Max: 37.2 (22 Mar 2020 21:49)  T(F): 97.9 (23 Mar 2020 05:19), Max: 98.9 (22 Mar 2020 21:49)  HR: 78 (23 Mar 2020 05:19) (73 - 98)  BP: 143/76 (23 Mar 2020 05:19) (124/65 - 172/89)  BP(mean): --  RR: 18 (23 Mar 2020 05:19) (18 - 20)  SpO2: 95% (23 Mar 2020 05:19) (94% - 100%)                          11.7   7.58  )-----------( 224      ( 23 Mar 2020 03:09 )             35.5                         14.3   8.02  )-----------( 285      ( 22 Mar 2020 11:11 )             43.8       03-23    137  |  104  |  11  ----------------------------<  95  3.5   |  23  |  0.49<L>        PT/INR - ( 23 Mar 2020 03:09 )   PT: 13.4 SEC;   INR: 1.16          PTT - ( 23 Mar 2020 03:09 )  PTT:31.3 SEC

## 2020-03-23 NOTE — PHYSICAL THERAPY INITIAL EVALUATION ADULT - PRECAUTIONS/LIMITATIONS, REHAB EVAL
osteopenia/surgical precautions/fall precautions osteopenia/surgical precautions/fall precautions/left hip precautions

## 2020-03-23 NOTE — OCCUPATIONAL THERAPY INITIAL EVALUATION ADULT - PRECAUTIONS/LIMITATIONS, REHAB EVAL
left hip precautions/surgical precautions/Anterior Dislocation precautions/aspiration precautions/fall precautions

## 2020-03-23 NOTE — OCCUPATIONAL THERAPY INITIAL EVALUATION ADULT - LIVES WITH, PROFILE
Pt. reports she lives with her sister in a house with 3 steps to enter. Once inside, pt. reports she has full flight of steps to negotiate to 2nd floor where main bedroom and bathroom are located. Per pt., she has a bathtub in her bathroom with grab bar available.

## 2020-03-23 NOTE — PHYSICAL THERAPY INITIAL EVALUATION ADULT - PLANNED THERAPY INTERVENTIONS, PT EVAL
transfer training/gait training/strengthening/postural re-education/ROM/balance training/bed mobility training

## 2020-03-23 NOTE — PHYSICAL THERAPY INITIAL EVALUATION ADULT - GENERAL OBSERVATIONS, REHAB EVAL
Pt encountered in semisupine position, no distress, AxOx4, with +IV, Left hip and left shin dressing dry/intact.

## 2020-03-23 NOTE — PROGRESS NOTE ADULT - ASSESSMENT
74F sp L FN Fx for PARMJIT today    ·	pain control  ·	IS  ·	NPO IVF  ·	Bedrest  ·	Cleared for OR Today  ·	DVT Ppx held

## 2020-03-23 NOTE — PROGRESS NOTE ADULT - SUBJECTIVE AND OBJECTIVE BOX
Orthopedic Surgery Progress Note  Pain well controlled.  No chest pain, shortness of breath, light-headedness. Denies numbness/tingling.    O:  Vital Signs Last 24 Hrs  T(C): 36.5 (23 Mar 2020 14:20), Max: 37.2 (22 Mar 2020 21:49)  T(F): 97.7 (23 Mar 2020 14:20), Max: 98.9 (22 Mar 2020 21:49)  HR: 81 (23 Mar 2020 14:20) (73 - 103)  BP: 131/61 (23 Mar 2020 14:20) (95/47 - 151/74)  BP(mean): 94 (23 Mar 2020 13:30) (58 - 94)  RR: 20 (23 Mar 2020 14:20) (13 - 21)  SpO2: 96% (23 Mar 2020 14:20) (91% - 100%)    Gen: NAD  LLE  Dressing C/D/I  EHL/FHL/TA/GS intact  SILT DP/SP/INIGUEZ/Sa  WWP distally    Labs:                        12.5   11.52 )-----------( 215      ( 23 Mar 2020 13:00 )             38.3                         11.7   7.58  )-----------( 224      ( 23 Mar 2020 03:09 )             35.5     03-23    142  |  109<H>  |  9   ----------------------------<  98  3.7   |  22  |  0.54    PT/INR - ( 23 Mar 2020 03:09 )   PT: 13.4 SEC;   INR: 1.16        PTT - ( 23 Mar 2020 03:09 )  PTT:31.3 SEC    A/P 74y year old female POD0 s/p Total left hip arthroplasty    Pain Control  DVT PPX  PT/OOB  WBAT  FU urine culture, will hold abx for now  Dispo Planning    Logan Bunn MD

## 2020-03-23 NOTE — OCCUPATIONAL THERAPY INITIAL EVALUATION ADULT - GENERAL OBSERVATIONS, REHAB EVAL
Pt. received semisupine in bed. No acute distress. Patient agreed to evaluation from Occupational Therapist. +IV.

## 2020-03-23 NOTE — PHYSICAL THERAPY INITIAL EVALUATION ADULT - ACTIVE RANGE OF MOTION EXAMINATION, REHAB EVAL
left knee flexion ~20 degrees/Right LE Active ROM was WFL (within functional limits)/bilateral upper extremity Active ROM was WFL (within functional limits)

## 2020-03-23 NOTE — OCCUPATIONAL THERAPY INITIAL EVALUATION ADULT - PERTINENT HX OF CURRENT PROBLEM, REHAB EVAL
Pt is a 74 year old female who presented to [facility] on 3/22/2020 s/p fall and Left LE pain. Xray of Left Femur on 3/22/2020 displayed displaced left hip intertrochanteric fracture. Pt is now s/p Total left hip arthroplasty on 3/23/2020.

## 2020-03-23 NOTE — PHYSICAL THERAPY INITIAL EVALUATION ADULT - RANGE OF MOTION EXAMINATION, REHAB EVAL
bilateral upper extremity ROM was WFL (within functional limits)/bilateral lower extremity ROM was WFL (within functional limits)/*Increased T/S kyphosis

## 2020-03-24 ENCOUNTER — TRANSCRIPTION ENCOUNTER (OUTPATIENT)
Age: 75
End: 2020-03-24

## 2020-03-24 VITALS — TEMPERATURE: 100 F

## 2020-03-24 LAB
ANION GAP SERPL CALC-SCNC: 12 MMO/L — SIGNIFICANT CHANGE UP (ref 7–14)
BUN SERPL-MCNC: 8 MG/DL — SIGNIFICANT CHANGE UP (ref 7–23)
CALCIUM SERPL-MCNC: 9 MG/DL — SIGNIFICANT CHANGE UP (ref 8.4–10.5)
CHLORIDE SERPL-SCNC: 104 MMOL/L — SIGNIFICANT CHANGE UP (ref 98–107)
CO2 SERPL-SCNC: 22 MMOL/L — SIGNIFICANT CHANGE UP (ref 22–31)
CREAT SERPL-MCNC: 0.5 MG/DL — SIGNIFICANT CHANGE UP (ref 0.5–1.3)
CULTURE RESULTS: NO GROWTH — SIGNIFICANT CHANGE UP
GLUCOSE SERPL-MCNC: 103 MG/DL — HIGH (ref 70–99)
HCT VFR BLD CALC: 38.9 % — SIGNIFICANT CHANGE UP (ref 34.5–45)
HGB BLD-MCNC: 12.9 G/DL — SIGNIFICANT CHANGE UP (ref 11.5–15.5)
MCHC RBC-ENTMCNC: 28.9 PG — SIGNIFICANT CHANGE UP (ref 27–34)
MCHC RBC-ENTMCNC: 33.2 % — SIGNIFICANT CHANGE UP (ref 32–36)
MCV RBC AUTO: 87.2 FL — SIGNIFICANT CHANGE UP (ref 80–100)
NRBC # FLD: 0 K/UL — SIGNIFICANT CHANGE UP (ref 0–0)
PLATELET # BLD AUTO: 216 K/UL — SIGNIFICANT CHANGE UP (ref 150–400)
PMV BLD: 10.8 FL — SIGNIFICANT CHANGE UP (ref 7–13)
POTASSIUM SERPL-MCNC: 3.4 MMOL/L — LOW (ref 3.5–5.3)
POTASSIUM SERPL-SCNC: 3.4 MMOL/L — LOW (ref 3.5–5.3)
RBC # BLD: 4.46 M/UL — SIGNIFICANT CHANGE UP (ref 3.8–5.2)
RBC # FLD: 13.4 % — SIGNIFICANT CHANGE UP (ref 10.3–14.5)
SODIUM SERPL-SCNC: 138 MMOL/L — SIGNIFICANT CHANGE UP (ref 135–145)
SPECIMEN SOURCE: SIGNIFICANT CHANGE UP
WBC # BLD: 9.69 K/UL — SIGNIFICANT CHANGE UP (ref 3.8–10.5)
WBC # FLD AUTO: 9.69 K/UL — SIGNIFICANT CHANGE UP (ref 3.8–10.5)

## 2020-03-24 RX ORDER — OXYCODONE HYDROCHLORIDE 5 MG/1
1 TABLET ORAL
Qty: 0 | Refills: 0 | DISCHARGE
Start: 2020-03-24

## 2020-03-24 RX ORDER — OXYCODONE HYDROCHLORIDE 5 MG/1
2.5 TABLET ORAL
Qty: 0 | Refills: 0 | DISCHARGE
Start: 2020-03-24

## 2020-03-24 RX ORDER — ACETAMINOPHEN 500 MG
3 TABLET ORAL
Qty: 0 | Refills: 0 | DISCHARGE
Start: 2020-03-24

## 2020-03-24 RX ORDER — POTASSIUM CHLORIDE 20 MEQ
20 PACKET (EA) ORAL ONCE
Refills: 0 | Status: COMPLETED | OUTPATIENT
Start: 2020-03-24 | End: 2020-03-24

## 2020-03-24 RX ADMIN — PANTOPRAZOLE SODIUM 40 MILLIGRAM(S): 20 TABLET, DELAYED RELEASE ORAL at 06:02

## 2020-03-24 RX ADMIN — Medication 20 MILLIEQUIVALENT(S): at 09:23

## 2020-03-24 RX ADMIN — Medication 975 MILLIGRAM(S): at 06:02

## 2020-03-24 RX ADMIN — Medication 100 MILLIGRAM(S): at 04:07

## 2020-03-24 RX ADMIN — Medication 975 MILLIGRAM(S): at 14:07

## 2020-03-24 RX ADMIN — SODIUM CHLORIDE 500 MILLILITER(S): 9 INJECTION INTRAMUSCULAR; INTRAVENOUS; SUBCUTANEOUS at 06:02

## 2020-03-24 RX ADMIN — Medication 81 MILLIGRAM(S): at 06:02

## 2020-03-24 RX ADMIN — Medication 975 MILLIGRAM(S): at 14:47

## 2020-03-24 NOTE — DISCHARGE NOTE NURSING/CASE MANAGEMENT/SOCIAL WORK - PATIENT PORTAL LINK FT
You can access the FollowMyHealth Patient Portal offered by [facility] by registering at the following website: [email or web address]. By joining [facility]’s FollowMyHealth portal, you will also be able to view your health information using other applications (apps) compatible with our system.

## 2020-03-24 NOTE — DISCHARGE NOTE PROVIDER - CARE PROVIDER_API CALL
Marcos Pritchard)  Orthopaedic Surgery  [address], [address]  [place] [address]  Phone: [phone]  Fax: [phone]  Follow Up Time: 2 weeks

## 2020-03-24 NOTE — DISCHARGE NOTE PROVIDER - HOSPITAL COURSE
HPI: A 74 year old Female with PMH of Osteopenia, Bilateral Cataracts, GERD c/o L hip pain s/p mechanical fall earlier today. Patient reports she was at the grocery store, and her foot got caught in the cart and therefore she fell. Patient denies having any head trauma, loss of consciousness and furthermore denies any chest pain, shortness of breath, palpitations, lightheadedness, abdominal pain, nausea, vomiting, pre or post falling. Patient reports having significant pain in her left hip, and therefore was brought to the ED. Further patient denies numbness or tingling in the LLE.         She was admitted and cleared for the OR. She underwent an uncomplicated Left PARMJIT HD2. She did well postoperative and walked with PT. HPI: A 74 year old Female with PMH of Osteopenia, Bilateral Cataracts, GERD c/o L hip pain s/p mechanical fall earlier today. Patient reports she was at the grocery store, and her foot got caught in the cart and therefore she fell. Patient denies having any head trauma, loss of consciousness and furthermore denies any chest pain, shortness of breath, palpitations, lightheadedness, abdominal pain, nausea, vomiting, pre or post falling. Patient reports having significant pain in her left hip, and therefore was brought to the ED. Further patient denies numbness or tingling in the LLE.         She was admitted and cleared for the OR. She underwent an uncomplicated Left PARMJIT HD2. She did well postoperative and walked with PT. She was hemodynamically stable and cleared by medicine and Orthopaedics to be cleared for PT

## 2020-03-24 NOTE — DISCHARGE NOTE PROVIDER - NSDCACTIVITY_GEN_ALL_CORE
Stairs allowed/Showering allowed/No heavy lifting/straining/Walking - Outdoors allowed/Walking - Indoors allowed

## 2020-03-24 NOTE — DISCHARGE NOTE PROVIDER - NSDCMRMEDTOKEN_GEN_ALL_CORE_FT
Boniva 150 mg oral tablet: 1 tab(s) orally once a month, First Sunday of each month  RABEprazole 20 mg oral tablet, extended release: 1 tab(s) orally once a day 3:1 commode: Dx: Displaced Left Femoral Neck Fracture, now sp Total Hip, Anterior approach  S72. 002A     acetaminophen 325 mg oral tablet: 3 tab(s) orally every 8 hours  aspirin 81 mg oral delayed release tablet: 1 tab(s) orally 2 times a day  Boniva 150 mg oral tablet: 1 tab(s) orally once a month, First Sunday of each month  oxyCODONE: 2.5 milligram(s) orally every 6 hours, As Needed Moderate Pain  oxyCODONE 5 mg oral tablet: 1 tab(s) orally every 4 hours, As needed, Severe Pain (7 - 10)  RABEprazole 20 mg oral tablet, extended release: 1 tab(s) orally once a day  rolling walker: Left Hip Displaced Femoral Neck Fracture sp Total Hip Arthroplasty  S72. 002A

## 2020-03-24 NOTE — DISCHARGE NOTE PROVIDER - NSDCFUADDINST_GEN_ALL_CORE_FT
PAIN CONTROL: Take oxycodone for severe pain only. When you no longer need to take the oxycodone for pain, you can start taking the tramadol only as needed for pain.   Alternate between taking Ibuprofen and Tylenol so you are taking pain medication every 3-4 hours if your pain is severe. It is important to ice and elevate your leg to keep swelling down and the pain manageable. Keep the ice on for 20 minutes, and then keep off for 20 minutes. Repeat while awake.  MEDICATIONS: Take all medications as prescribed. It is important to take your blood thinner medication to prevent dangerous blood clots(deep vein thrombosis).   WOUND CARE: leave dressing clean and dry  BATHING: Please do not submerge wound underwater. You may shower and/or sponge bathe.   ACTIVITY: Please use Anterior Hip Precautions as instructed by Physical Therapy. No heavy lifting or straining. Otherwise, you may return to your usual level of physical activity. If you are taking narcotic pain medication (such as Oxycodone) DO NOT drive a car, operate machinery or make important decisions.   DIET: Return to your usual diet. Narcotic pain medicine can cause extreme nausea and constipation. Drink plenty of water and take diuretics (colace, Miralax) as needed. You can get them from your local pharmacy.  NOTIFY YOUR SURGEON IF: You have any bleeding that does not stop, any pus draining from your wound(s), any fever (over 100.4 F) or chills, persistent nausea/vomiting, persistent diarrhea, or if your pain is not controlled on your discharge pain medications. PAIN CONTROL: Take oxycodone for severe pain only. When you no longer need to take the oxycodone for pain, you can start taking the tramadol only as needed for pain.   Alternate between taking Ibuprofen and Tylenol so you are taking pain medication every 3-4 hours if your pain is severe. It is important to ice and elevate your leg to keep swelling down and the pain manageable. Keep the ice on for 20 minutes, and then keep off for 20 minutes. Repeat while awake.  MEDICATIONS: Take all medications as prescribed. It is important to take your blood thinner medication to prevent dangerous blood clots(deep vein thrombosis).   WOUND CARE: leave dressing clean and dry until you see Dr. Pritchard in the office in 2 weeks.  BATHING: Please do not submerge wound underwater. You may shower and/or sponge bathe.   ACTIVITY: Please use Anterior Hip Precautions as instructed by Physical Therapy. No heavy lifting or straining. Otherwise, you may return to your usual level of physical activity. If you are taking narcotic pain medication (such as Oxycodone) DO NOT drive a car, operate machinery or make important decisions.   DIET: Return to your usual diet. Narcotic pain medicine can cause extreme nausea and constipation. Drink plenty of water and take diuretics (colace, Miralax) as needed. You can get them from your local pharmacy.  NOTIFY YOUR SURGEON IF: You have any bleeding that does not stop, any pus draining from your wound(s), any fever (over 100.4 F) or chills, persistent nausea/vomiting, persistent diarrhea, or if your pain is not controlled on your discharge pain medications.

## 2020-03-24 NOTE — PROGRESS NOTE ADULT - SUBJECTIVE AND OBJECTIVE BOX
Orthopedic Surgery Progress Note  No acute events overnight.  Pain well controlled.  Doing excellent. Ambulated in hallway with PT yesterday.    O:  Vital Signs Last 24 Hrs  T(C): 37.1 (24 Mar 2020 06:00), Max: 37.1 (24 Mar 2020 06:00)  T(F): 98.7 (24 Mar 2020 06:00), Max: 98.7 (24 Mar 2020 06:00)  HR: 88 (24 Mar 2020 06:00) (75 - 103)  BP: 126/67 (24 Mar 2020 06:00) (95/47 - 151/74)  BP(mean): 94 (23 Mar 2020 13:30) (58 - 94)  RR: 17 (24 Mar 2020 06:00) (13 - 21)  SpO2: 94% (24 Mar 2020 06:00) (91% - 99%)    Gen: NAD  LLE  Dressing C/D/I  EHL/FHL/TA/GS intact  SILT DP/SP/INIGUEZ/Sa  WWP distally    Labs:                        12.5   11.52 )-----------( 215      ( 23 Mar 2020 13:00 )             38.3                         11.7   7.58  )-----------( 224      ( 23 Mar 2020 03:09 )             35.5     03-23    142  |  109<H>  |  9   ----------------------------<  98  3.7   |  22  |  0.54    PT/INR - ( 23 Mar 2020 03:09 )   PT: 13.4 SEC;   INR: 1.16        PTT - ( 23 Mar 2020 03:09 )  PTT:31.3 SEC    A/P 74y year old female POD1 s/p Total left hip arthroplasty    Pain Control  DVT PPX  PT/OOB  WBAT   FU UCx, holding abx for now for +/- UA  Dispo Planning: home vs. rehab, patient wants home, PT recommending rehab at this time    Logan Bunn MD

## 2020-03-25 LAB — SURGICAL PATHOLOGY STUDY: SIGNIFICANT CHANGE UP

## 2020-04-03 RX ORDER — MELOXICAM 15 MG/1
15 TABLET ORAL
Qty: 30 | Refills: 1 | Status: ACTIVE | COMMUNITY
Start: 2020-04-03 | End: 1900-01-01

## 2020-04-09 ENCOUNTER — APPOINTMENT (OUTPATIENT)
Dept: ORTHOPEDIC SURGERY | Facility: CLINIC | Age: 75
End: 2020-04-09
Payer: MEDICARE

## 2020-04-09 VITALS
DIASTOLIC BLOOD PRESSURE: 85 MMHG | WEIGHT: 140 LBS | BODY MASS INDEX: 26.43 KG/M2 | HEART RATE: 105 BPM | SYSTOLIC BLOOD PRESSURE: 161 MMHG | HEIGHT: 61 IN | TEMPERATURE: 98.2 F

## 2020-04-09 PROCEDURE — 99024 POSTOP FOLLOW-UP VISIT: CPT

## 2020-04-09 PROCEDURE — 73502 X-RAY EXAM HIP UNI 2-3 VIEWS: CPT | Mod: LT

## 2020-05-06 ENCOUNTER — APPOINTMENT (OUTPATIENT)
Dept: ORTHOPEDIC SURGERY | Facility: CLINIC | Age: 75
End: 2020-05-06
Payer: MEDICARE

## 2020-05-06 VITALS
WEIGHT: 140 LBS | SYSTOLIC BLOOD PRESSURE: 141 MMHG | HEIGHT: 61 IN | BODY MASS INDEX: 26.43 KG/M2 | HEART RATE: 84 BPM | TEMPERATURE: 97.6 F | DIASTOLIC BLOOD PRESSURE: 63 MMHG

## 2020-05-06 PROCEDURE — 99024 POSTOP FOLLOW-UP VISIT: CPT

## 2020-05-06 RX ORDER — MELOXICAM 15 MG/1
15 TABLET ORAL
Qty: 30 | Refills: 0 | Status: ACTIVE | COMMUNITY
Start: 2020-05-06 | End: 1900-01-01

## 2020-05-06 NOTE — HISTORY OF PRESENT ILLNESS
[Healed] : healed [Neuro Intact] : an unremarkable neurological exam [Vascular Intact] : ~T peripheral vascular exam normal [Negative Db's] : maneuvers demonstrated a negative Db's sign [Slow Progress] : is progressing slowly [No Sign of Infection] : is showing no signs of infection [Adequate Pain Control] : has adequate pain control [de-identified] : Left total hip replacement \par 3/22/2020 [de-identified] : DARRELL LOMBARDO is doing well s/p left total hip replacement.  she  is participating in outpatient physical therapy 3 days per week, as well as a home exercise program daily. She notes she is making progress with ambulation, but continues to have difficulty with SLR. She complains of soreness along the lateral aspect of her proximal thigh. she  is taking tylenol and meloxicam for pain and pain level is controlled to about a 3/10 on average. she  is taking Ecotrin 81mg BID for DVT ppx.\par  [de-identified] : Left  hip: Walks with  stiff hip gait. There is a well healed scar of surgery with no significant swelling, redness, or tenderness. Range of motion of the hip: active SLR of 10 degrees and hip flexion to 60 degrees Abduction 30 degrees adduction 15 degrees external rotation 30 degrees internal rotation 15 degrees with hip abductor extensor power grade 3.\par  [de-identified] : AP and false profile views of the left hip and AP view of the pelvis taken last visit reveal a well fixed and aligned left total hip replacement with no signs of mechanical failure or periprosthetic fracture.\par \par  [de-identified] : She has been making progress s/p left total hip replacement, with quad weakness. \par She has been recommended for continued physical therapy, and has been provided an updated prescription. \par She has also been recommended continued use of meloxicam as needed, along with over the counter Tylenol as needed. \par She will follow up in four weeks with Dr. Pritchard

## 2020-06-03 ENCOUNTER — APPOINTMENT (OUTPATIENT)
Dept: ORTHOPEDIC SURGERY | Facility: CLINIC | Age: 75
End: 2020-06-03
Payer: MEDICARE

## 2020-06-03 VITALS
DIASTOLIC BLOOD PRESSURE: 84 MMHG | WEIGHT: 140 LBS | BODY MASS INDEX: 26.43 KG/M2 | SYSTOLIC BLOOD PRESSURE: 138 MMHG | TEMPERATURE: 97.9 F | HEIGHT: 61 IN | HEART RATE: 83 BPM

## 2020-06-03 PROCEDURE — 99024 POSTOP FOLLOW-UP VISIT: CPT

## 2020-06-03 NOTE — HISTORY OF PRESENT ILLNESS
[Healed] : healed [Neuro Intact] : an unremarkable neurological exam [Vascular Intact] : ~T peripheral vascular exam normal [Negative Db's] : maneuvers demonstrated a negative Db's sign [Slow Progress] : is progressing slowly [No Sign of Infection] : is showing no signs of infection [Adequate Pain Control] : has adequate pain control [de-identified] : Left total hip replacement \par 3/22/2020 [de-identified] : DARRELL LOMBARDO is doing well s/p left total hip replacement.  she  is participating in outpatient physical therapy 3 days per week, as well as a home exercise program daily. She notes she is making progress with ambulation, but continues to have weakness of the quad. She notes her pain is improved, and she denies use of medications at this time. She presents ambulating with a cane.  [de-identified] : Left  hip: Walks with stiff hip gait. There is a well healed scar of surgery with no significant swelling, redness, or tenderness. Range of motion of the hip: active SLR of 30 degrees and hip flexion to 60 degrees Abduction 30 degrees adduction 15 degrees external rotation 30 degrees internal rotation 15 degrees with hip abductor extensor power grade 3.\par  [de-identified] : She has been making progress s/p left total hip replacement, with quad weakness. \par She has been recommended for continued physical therapy, and has been provided an additional prescription. \par She has also been recommended continued use of meloxicam as needed, along with over the counter Tylenol as needed. \par She has been recommended to follow up annually or sooner if needed.  [de-identified] : AP and false profile views of the left hip and AP view of the pelvis taken last visit reveal a well fixed and aligned left total hip replacement with no signs of mechanical failure or periprosthetic fracture.\par \par

## 2020-12-31 NOTE — OCCUPATIONAL THERAPY INITIAL EVALUATION ADULT - LOWER BODY DRESSING, PREVIOUS LEVEL OF FUNCTION, OT EVAL
Please notify Patient of + UC and that script sent.  Thank you.  Mandi BALL    independent

## 2021-03-24 ENCOUNTER — APPOINTMENT (OUTPATIENT)
Dept: ORTHOPEDIC SURGERY | Facility: CLINIC | Age: 76
End: 2021-03-24
Payer: MEDICARE

## 2021-03-24 VITALS
SYSTOLIC BLOOD PRESSURE: 155 MMHG | HEIGHT: 61 IN | DIASTOLIC BLOOD PRESSURE: 80 MMHG | BODY MASS INDEX: 20.77 KG/M2 | WEIGHT: 110 LBS | HEART RATE: 88 BPM

## 2021-03-24 DIAGNOSIS — Z96.642 PRESENCE OF LEFT ARTIFICIAL HIP JOINT: ICD-10-CM

## 2021-03-24 PROCEDURE — 73502 X-RAY EXAM HIP UNI 2-3 VIEWS: CPT | Mod: LT

## 2021-03-24 PROCEDURE — 99213 OFFICE O/P EST LOW 20 MIN: CPT

## 2021-03-24 NOTE — DISCUSSION/SUMMARY
[de-identified] : Patient was advised of her findings and shown her x-rays she appears to have an excellent result and will maintain activity level to tolerance.  Follow-up in 1 year to check her progress

## 2021-03-24 NOTE — HISTORY OF PRESENT ILLNESS
[Stable] : stable [0] : a minimum pain level of 0/10 [1] : a maximum pain level of 1/10 [Intermit.] : ~He/She~ states the symptoms seem to be intermittent [de-identified] : Pt presents for initial evaluation for follow up for her THR  left 3/23/2020. PT has some pain when sitting .

## 2021-03-24 NOTE — PHYSICAL EXAM
[de-identified] : Physical examination of the left hip discloses well-healed incision.  No acute defects or deformities erythema or effusions.  Functional stable nontender range of motion to the left hip.  No acute neurovascular deficits noted. [de-identified] : X-rays taken of the left hip and AP and lateral projections disclose maintained integrity of the total hip implants.  No signs of loosening or component wear

## 2022-02-12 NOTE — PRE-OP CHECKLIST - 2.
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 Pt now s/p IR guided right sided pleurocentesis 2/11, c/b pneumothorax, now w/ chest tube in place pending removal See PACU flow sheets for ASU pre-op v/s

## 2023-06-05 NOTE — OCCUPATIONAL THERAPY INITIAL EVALUATION ADULT - LIGHT TOUCH SENSATION, RUE, REHAB EVAL
within normal limits Consent (Nose)/Introductory Paragraph: The rationale for Mohs was explained to the patient and consent was obtained. The risks, benefits and alternatives to therapy were discussed in detail. Specifically, the risks of nasal deformity, changes in the flow of air through the nose, infection, scarring, bleeding, prolonged wound healing, incomplete removal, allergy to anesthesia, nerve injury and recurrence were addressed. Prior to the procedure, the treatment site was clearly identified and confirmed by the patient. All components of Universal Protocol/PAUSE Rule completed.

## 2024-06-27 ENCOUNTER — APPOINTMENT (OUTPATIENT)
Dept: ORTHOPEDIC SURGERY | Facility: CLINIC | Age: 79
End: 2024-06-27
Payer: MEDICARE

## 2024-06-27 DIAGNOSIS — M18.12 UNILATERAL PRIMARY OSTEOARTHRITIS OF FIRST CARPOMETACARPAL JOINT, LEFT HAND: ICD-10-CM

## 2024-06-27 DIAGNOSIS — M19.032 PRIMARY OSTEOARTHRITIS, LEFT WRIST: ICD-10-CM

## 2024-06-27 PROCEDURE — 99204 OFFICE O/P NEW MOD 45 MIN: CPT

## 2024-06-27 PROCEDURE — 73110 X-RAY EXAM OF WRIST: CPT | Mod: 26,LT

## 2024-07-17 ENCOUNTER — EMERGENCY (EMERGENCY)
Facility: HOSPITAL | Age: 79
LOS: 1 days | Discharge: HOME | End: 2024-07-17
Attending: EMERGENCY MEDICINE | Admitting: EMERGENCY MEDICINE
Payer: MEDICARE

## 2024-07-17 VITALS
HEART RATE: 108 BPM | WEIGHT: 110.01 LBS | SYSTOLIC BLOOD PRESSURE: 139 MMHG | OXYGEN SATURATION: 96 % | DIASTOLIC BLOOD PRESSURE: 56 MMHG | TEMPERATURE: 99 F | RESPIRATION RATE: 22 BRPM | HEIGHT: 62 IN

## 2024-07-17 LAB
ALBUMIN SERPL ELPH-MCNC: 4 G/DL — SIGNIFICANT CHANGE UP (ref 3.3–5)
ALP SERPL-CCNC: 99 U/L — SIGNIFICANT CHANGE UP (ref 40–120)
ALT FLD-CCNC: 19 U/L — SIGNIFICANT CHANGE UP (ref 4–33)
ANION GAP SERPL CALC-SCNC: 13 MMOL/L — SIGNIFICANT CHANGE UP (ref 7–14)
AST SERPL-CCNC: 30 U/L — SIGNIFICANT CHANGE UP (ref 4–32)
B PERT IGG+IGM PNL SER: ABNORMAL
BASOPHILS # BLD AUTO: 0.02 K/UL — SIGNIFICANT CHANGE UP (ref 0–0.2)
BASOPHILS NFR BLD AUTO: 0.1 % — SIGNIFICANT CHANGE UP (ref 0–2)
BILIRUB SERPL-MCNC: 0.8 MG/DL — SIGNIFICANT CHANGE UP (ref 0.2–1.2)
BUN SERPL-MCNC: 14 MG/DL — SIGNIFICANT CHANGE UP (ref 7–23)
CALCIUM SERPL-MCNC: 9.1 MG/DL — SIGNIFICANT CHANGE UP (ref 8.4–10.5)
CHLORIDE SERPL-SCNC: 100 MMOL/L — SIGNIFICANT CHANGE UP (ref 98–107)
CO2 SERPL-SCNC: 26 MMOL/L — SIGNIFICANT CHANGE UP (ref 22–31)
COLOR FLD: YELLOW
CREAT SERPL-MCNC: 0.55 MG/DL — SIGNIFICANT CHANGE UP (ref 0.5–1.3)
CRP SERPL-MCNC: 156 MG/L — HIGH
CRYSTALS, BODY FLUID CLARITY: ABNORMAL
CRYSTALS, BODY FLUID COLOR: YELLOW
CRYSTALS, BODY FLUID ID: ABNORMAL
CRYSTALS, BODY FLUID TUBE: SIGNIFICANT CHANGE UP
EGFR: 93 ML/MIN/1.73M2 — SIGNIFICANT CHANGE UP
EOSINOPHIL # BLD AUTO: 0 K/UL — SIGNIFICANT CHANGE UP (ref 0–0.5)
EOSINOPHIL NFR BLD AUTO: 0 % — SIGNIFICANT CHANGE UP (ref 0–6)
ERYTHROCYTE [SEDIMENTATION RATE] IN BLOOD: 56 MM/HR — HIGH (ref 4–25)
FLUID INTAKE SUBSTANCE CLASS: SIGNIFICANT CHANGE UP
GLUCOSE FLD-MCNC: 14 MG/DL — SIGNIFICANT CHANGE UP
GLUCOSE SERPL-MCNC: 123 MG/DL — HIGH (ref 70–99)
HCT VFR BLD CALC: 36.5 % — SIGNIFICANT CHANGE UP (ref 34.5–45)
HGB BLD-MCNC: 12.1 G/DL — SIGNIFICANT CHANGE UP (ref 11.5–15.5)
IANC: 11.43 K/UL — HIGH (ref 1.8–7.4)
IMM GRANULOCYTES NFR BLD AUTO: 0.4 % — SIGNIFICANT CHANGE UP (ref 0–0.9)
LYMPHOCYTES # BLD AUTO: 0.6 K/UL — LOW (ref 1–3.3)
LYMPHOCYTES # BLD AUTO: 4.5 % — LOW (ref 13–44)
MCHC RBC-ENTMCNC: 28.4 PG — SIGNIFICANT CHANGE UP (ref 27–34)
MCHC RBC-ENTMCNC: 33.2 GM/DL — SIGNIFICANT CHANGE UP (ref 32–36)
MCV RBC AUTO: 85.7 FL — SIGNIFICANT CHANGE UP (ref 80–100)
MONOCYTES # BLD AUTO: 1.37 K/UL — HIGH (ref 0–0.9)
MONOCYTES NFR BLD AUTO: 10.2 % — SIGNIFICANT CHANGE UP (ref 2–14)
MONOS+MACROS # FLD: 7 % — SIGNIFICANT CHANGE UP
NEUTROPHILS # BLD AUTO: 11.43 K/UL — HIGH (ref 1.8–7.4)
NEUTROPHILS NFR BLD AUTO: 84.8 % — HIGH (ref 43–77)
NEUTROPHILS-BODY FLUID: 93 % — SIGNIFICANT CHANGE UP
NRBC # BLD: 0 /100 WBCS — SIGNIFICANT CHANGE UP (ref 0–0)
NRBC # FLD: 0 K/UL — SIGNIFICANT CHANGE UP (ref 0–0)
PLATELET # BLD AUTO: 253 K/UL — SIGNIFICANT CHANGE UP (ref 150–400)
POTASSIUM SERPL-MCNC: 3.2 MMOL/L — LOW (ref 3.5–5.3)
POTASSIUM SERPL-SCNC: 3.2 MMOL/L — LOW (ref 3.5–5.3)
PROT FLD-MCNC: 4.8 G/DL — SIGNIFICANT CHANGE UP
PROT SERPL-MCNC: 7.1 G/DL — SIGNIFICANT CHANGE UP (ref 6–8.3)
RBC # BLD: 4.26 M/UL — SIGNIFICANT CHANGE UP (ref 3.8–5.2)
RBC # FLD: 13.5 % — SIGNIFICANT CHANGE UP (ref 10.3–14.5)
RCV VOL RI: HIGH CELLS/UL (ref 0–5)
SODIUM SERPL-SCNC: 139 MMOL/L — SIGNIFICANT CHANGE UP (ref 135–145)
SPECIMEN SOURCE FLD: SIGNIFICANT CHANGE UP
TOTAL CELLS COUNTED, BODY FLUID: 100 CELLS — SIGNIFICANT CHANGE UP
TOTAL NUCLEATED CELL COUNT, BODY FLUID: HIGH CELLS/UL (ref 0–5)
TUBE TYPE: SIGNIFICANT CHANGE UP
WBC # BLD: 13.48 K/UL — HIGH (ref 3.8–10.5)
WBC # FLD AUTO: 13.48 K/UL — HIGH (ref 3.8–10.5)

## 2024-07-17 PROCEDURE — 73590 X-RAY EXAM OF LOWER LEG: CPT | Mod: 26,RT

## 2024-07-17 PROCEDURE — 88108 CYTOPATH CONCENTRATE TECH: CPT | Mod: 26

## 2024-07-17 PROCEDURE — 73552 X-RAY EXAM OF FEMUR 2/>: CPT | Mod: 26,RT

## 2024-07-17 PROCEDURE — 20610 DRAIN/INJ JOINT/BURSA W/O US: CPT | Mod: RT

## 2024-07-17 PROCEDURE — 99285 EMERGENCY DEPT VISIT HI MDM: CPT | Mod: FS,25,GC

## 2024-07-17 PROCEDURE — 73564 X-RAY EXAM KNEE 4 OR MORE: CPT | Mod: 26,RT

## 2024-07-17 RX ORDER — POTASSIUM CHLORIDE 600 MG/1
40 TABLET, FILM COATED, EXTENDED RELEASE ORAL ONCE
Refills: 0 | Status: COMPLETED | OUTPATIENT
Start: 2024-07-17 | End: 2024-07-17

## 2024-07-17 RX ORDER — LIDOCAINE HYDROCHLORIDE 20 MG/ML
10 INJECTION, SOLUTION EPIDURAL; INFILTRATION; INTRACAUDAL; PERINEURAL ONCE
Refills: 0 | Status: COMPLETED | OUTPATIENT
Start: 2024-07-17 | End: 2024-07-17

## 2024-07-17 RX ORDER — ACETAMINOPHEN 325 MG
750 TABLET ORAL ONCE
Refills: 0 | Status: COMPLETED | OUTPATIENT
Start: 2024-07-17 | End: 2024-07-17

## 2024-07-17 RX ORDER — KETOROLAC TROMETHAMINE 30 MG/ML
15 INJECTION, SOLUTION INTRAMUSCULAR ONCE
Refills: 0 | Status: DISCONTINUED | OUTPATIENT
Start: 2024-07-17 | End: 2024-07-17

## 2024-07-17 RX ADMIN — KETOROLAC TROMETHAMINE 15 MILLIGRAM(S): 30 INJECTION, SOLUTION INTRAMUSCULAR at 12:32

## 2024-07-17 RX ADMIN — LIDOCAINE HYDROCHLORIDE 10 MILLILITER(S): 20 INJECTION, SOLUTION EPIDURAL; INFILTRATION; INTRACAUDAL; PERINEURAL at 13:30

## 2024-07-17 RX ADMIN — Medication 300 MILLIGRAM(S): at 11:08

## 2024-07-17 RX ADMIN — POTASSIUM CHLORIDE 40 MILLIEQUIVALENT(S): 600 TABLET, FILM COATED, EXTENDED RELEASE ORAL at 11:45

## 2024-07-18 VITALS
SYSTOLIC BLOOD PRESSURE: 139 MMHG | DIASTOLIC BLOOD PRESSURE: 62 MMHG | RESPIRATION RATE: 16 BRPM | HEART RATE: 83 BPM | TEMPERATURE: 98 F | OXYGEN SATURATION: 95 %

## 2024-07-18 LAB — COMMENT - FLUIDS: SIGNIFICANT CHANGE UP

## 2024-08-01 ENCOUNTER — APPOINTMENT (OUTPATIENT)
Dept: ORTHOPEDIC SURGERY | Facility: CLINIC | Age: 79
End: 2024-08-01
Payer: MEDICARE

## 2024-08-01 VITALS
WEIGHT: 110 LBS | HEART RATE: 109 BPM | OXYGEN SATURATION: 95 % | BODY MASS INDEX: 20.24 KG/M2 | HEIGHT: 62 IN | SYSTOLIC BLOOD PRESSURE: 149 MMHG | DIASTOLIC BLOOD PRESSURE: 80 MMHG

## 2024-08-01 DIAGNOSIS — M17.11 UNILATERAL PRIMARY OSTEOARTHRITIS, RIGHT KNEE: ICD-10-CM

## 2024-08-01 DIAGNOSIS — Z96.642 PRESENCE OF LEFT ARTIFICIAL HIP JOINT: ICD-10-CM

## 2024-08-01 PROCEDURE — 73562 X-RAY EXAM OF KNEE 3: CPT | Mod: 50

## 2024-08-01 PROCEDURE — 20610 DRAIN/INJ JOINT/BURSA W/O US: CPT | Mod: LT

## 2024-08-01 PROCEDURE — 99205 OFFICE O/P NEW HI 60 MIN: CPT | Mod: 25

## 2024-08-12 ENCOUNTER — APPOINTMENT (OUTPATIENT)
Dept: CT IMAGING | Facility: CLINIC | Age: 79
End: 2024-08-12
Payer: MEDICARE

## 2024-08-12 PROCEDURE — 73700 CT LOWER EXTREMITY W/O DYE: CPT | Mod: 26,RT,MH

## 2024-08-16 ENCOUNTER — APPOINTMENT (OUTPATIENT)
Dept: ORTHOPEDIC SURGERY | Facility: CLINIC | Age: 79
End: 2024-08-16
Payer: MEDICARE

## 2024-08-16 DIAGNOSIS — M25.431 EFFUSION, RIGHT WRIST: ICD-10-CM

## 2024-08-16 PROCEDURE — 73110 X-RAY EXAM OF WRIST: CPT | Mod: 26,RT

## 2024-08-16 PROCEDURE — 99214 OFFICE O/P EST MOD 30 MIN: CPT

## 2024-08-16 RX ORDER — METHYLPREDNISOLONE 4 MG/1
4 TABLET ORAL
Qty: 1 | Refills: 0 | Status: ACTIVE | COMMUNITY
Start: 2024-08-16 | End: 1900-01-01

## 2024-08-16 NOTE — PHYSICAL EXAM
[de-identified] : Patient is alert, oriented and in no acute distress. Affect and general appearance are normal and patient is able to answer questions appropriately. On the right, she is able to make a full fist. Full wrist pronation and supination. Flexion to 50, extension to 50. Tender to palpation over the dorsal wrist and basal joint. Moderate swelling over the dorsal wrist. No signs of infection.   Neurologic: Median, ulnar, and radial motor and sensory are intact.  Skin: No cyanosis, clubbing, edema or rashes.  Vascular: Radial pulses intact.  Lymphatic: No streaking or epitrochlear adenopathy.  [de-identified] : Three views of the right wrist were obtained and reviewed. No evidence of fracture/dislocation. No foreign body or calcifications. Moderate basal joint arthritis.

## 2024-08-16 NOTE — ASSESSMENT
[FreeTextEntry1] : 78 year old female presents with right wrist swelling of unknown etiology. We discussed the uncertain prognosis and nature of this condition as well as treatment options. A medrol dose pack will be prescribed to the patient, which she will take as instructed. She will wear the brace she has at home. She may also continue to take the Meloxicam from her previous visit. Follow up in 1 week as needed.

## 2024-08-16 NOTE — HISTORY OF PRESENT ILLNESS
[FreeTextEntry1] : 78F presents for evaluation of right hand swelling and pain. Patient states on 8/12/24 she notices swelling in her right hand. Patient denies any trauma to the area. Patient states her right wrist also hurts when she tries to move it and especially when putting weight on it. Patient has not done anything for her wrist but take Tylenol. Denies numbness and tingling. SHe reports the pain and swelling has been improving since it initially started.   Previously seen for left basal joint and STT arthritis.

## 2024-10-02 ENCOUNTER — OUTPATIENT (OUTPATIENT)
Dept: OUTPATIENT SERVICES | Facility: HOSPITAL | Age: 79
LOS: 1 days | End: 2024-10-02

## 2024-10-02 VITALS
RESPIRATION RATE: 14 BRPM | TEMPERATURE: 97 F | HEART RATE: 83 BPM | HEIGHT: 60 IN | WEIGHT: 113.98 LBS | OXYGEN SATURATION: 96 % | SYSTOLIC BLOOD PRESSURE: 145 MMHG | DIASTOLIC BLOOD PRESSURE: 70 MMHG

## 2024-10-02 DIAGNOSIS — M17.11 UNILATERAL PRIMARY OSTEOARTHRITIS, RIGHT KNEE: ICD-10-CM

## 2024-10-02 DIAGNOSIS — F05 DELIRIUM DUE TO KNOWN PHYSIOLOGICAL CONDITION: ICD-10-CM

## 2024-10-02 DIAGNOSIS — Z96.642 PRESENCE OF LEFT ARTIFICIAL HIP JOINT: Chronic | ICD-10-CM

## 2024-10-02 DIAGNOSIS — M19.90 UNSPECIFIED OSTEOARTHRITIS, UNSPECIFIED SITE: ICD-10-CM

## 2024-10-02 DIAGNOSIS — M20.40 OTHER HAMMER TOE(S) (ACQUIRED), UNSPECIFIED FOOT: Chronic | ICD-10-CM

## 2024-10-02 LAB
A1C WITH ESTIMATED AVERAGE GLUCOSE RESULT: 5.1 % — SIGNIFICANT CHANGE UP (ref 4–5.6)
ANION GAP SERPL CALC-SCNC: 16 MMOL/L — HIGH (ref 7–14)
APPEARANCE UR: CLEAR — SIGNIFICANT CHANGE UP
BACTERIA # UR AUTO: NEGATIVE /HPF — SIGNIFICANT CHANGE UP
BASOPHILS # BLD AUTO: 0.03 K/UL — SIGNIFICANT CHANGE UP (ref 0–0.2)
BASOPHILS NFR BLD AUTO: 0.5 % — SIGNIFICANT CHANGE UP (ref 0–2)
BILIRUB UR-MCNC: NEGATIVE — SIGNIFICANT CHANGE UP
BLD GP AB SCN SERPL QL: NEGATIVE — SIGNIFICANT CHANGE UP
BUN SERPL-MCNC: 16 MG/DL — SIGNIFICANT CHANGE UP (ref 7–23)
CALCIUM SERPL-MCNC: 9.4 MG/DL — SIGNIFICANT CHANGE UP (ref 8.4–10.5)
CAST: 0 /LPF — SIGNIFICANT CHANGE UP (ref 0–4)
CHLORIDE SERPL-SCNC: 102 MMOL/L — SIGNIFICANT CHANGE UP (ref 98–107)
CO2 SERPL-SCNC: 22 MMOL/L — SIGNIFICANT CHANGE UP (ref 22–31)
COLOR SPEC: YELLOW — SIGNIFICANT CHANGE UP
CREAT SERPL-MCNC: 0.51 MG/DL — SIGNIFICANT CHANGE UP (ref 0.5–1.3)
DIFF PNL FLD: ABNORMAL
EGFR: 94 ML/MIN/1.73M2 — SIGNIFICANT CHANGE UP
EOSINOPHIL # BLD AUTO: 0.16 K/UL — SIGNIFICANT CHANGE UP (ref 0–0.5)
EOSINOPHIL NFR BLD AUTO: 2.5 % — SIGNIFICANT CHANGE UP (ref 0–6)
ESTIMATED AVERAGE GLUCOSE: 100 — SIGNIFICANT CHANGE UP
GLUCOSE SERPL-MCNC: 84 MG/DL — SIGNIFICANT CHANGE UP (ref 70–99)
GLUCOSE UR QL: NEGATIVE MG/DL — SIGNIFICANT CHANGE UP
HCT VFR BLD CALC: 39.1 % — SIGNIFICANT CHANGE UP (ref 34.5–45)
HGB BLD-MCNC: 12 G/DL — SIGNIFICANT CHANGE UP (ref 11.5–15.5)
IMM GRANULOCYTES NFR BLD AUTO: 0.3 % — SIGNIFICANT CHANGE UP (ref 0–0.9)
KETONES UR-MCNC: NEGATIVE MG/DL — SIGNIFICANT CHANGE UP
LEUKOCYTE ESTERASE UR-ACNC: ABNORMAL
LYMPHOCYTES # BLD AUTO: 1.21 K/UL — SIGNIFICANT CHANGE UP (ref 1–3.3)
LYMPHOCYTES # BLD AUTO: 19.1 % — SIGNIFICANT CHANGE UP (ref 13–44)
MCHC RBC-ENTMCNC: 26.7 PG — LOW (ref 27–34)
MCHC RBC-ENTMCNC: 30.7 GM/DL — LOW (ref 32–36)
MCV RBC AUTO: 87.1 FL — SIGNIFICANT CHANGE UP (ref 80–100)
MONOCYTES # BLD AUTO: 0.42 K/UL — SIGNIFICANT CHANGE UP (ref 0–0.9)
MONOCYTES NFR BLD AUTO: 6.6 % — SIGNIFICANT CHANGE UP (ref 2–14)
NEUTROPHILS # BLD AUTO: 4.49 K/UL — SIGNIFICANT CHANGE UP (ref 1.8–7.4)
NEUTROPHILS NFR BLD AUTO: 71 % — SIGNIFICANT CHANGE UP (ref 43–77)
NITRITE UR-MCNC: NEGATIVE — SIGNIFICANT CHANGE UP
PH UR: 6 — SIGNIFICANT CHANGE UP (ref 5–8)
PLATELET # BLD AUTO: 335 K/UL — SIGNIFICANT CHANGE UP (ref 150–400)
POTASSIUM SERPL-MCNC: 3.9 MMOL/L — SIGNIFICANT CHANGE UP (ref 3.5–5.3)
POTASSIUM SERPL-SCNC: 3.9 MMOL/L — SIGNIFICANT CHANGE UP (ref 3.5–5.3)
PROT UR-MCNC: NEGATIVE MG/DL — SIGNIFICANT CHANGE UP
RBC # BLD: 4.49 M/UL — SIGNIFICANT CHANGE UP (ref 3.8–5.2)
RBC # FLD: 15.2 % — HIGH (ref 10.3–14.5)
RBC CASTS # UR COMP ASSIST: 0 /HPF — SIGNIFICANT CHANGE UP (ref 0–4)
REVIEW: SIGNIFICANT CHANGE UP
RH IG SCN BLD-IMP: POSITIVE — SIGNIFICANT CHANGE UP
RH IG SCN BLD-IMP: POSITIVE — SIGNIFICANT CHANGE UP
SODIUM SERPL-SCNC: 140 MMOL/L — SIGNIFICANT CHANGE UP (ref 135–145)
SP GR SPEC: 1.01 — SIGNIFICANT CHANGE UP (ref 1–1.03)
SQUAMOUS # UR AUTO: 1 /HPF — SIGNIFICANT CHANGE UP (ref 0–5)
UROBILINOGEN FLD QL: 0.2 MG/DL — SIGNIFICANT CHANGE UP (ref 0.2–1)
WBC # BLD: 6.33 K/UL — SIGNIFICANT CHANGE UP (ref 3.8–10.5)
WBC # FLD AUTO: 6.33 K/UL — SIGNIFICANT CHANGE UP (ref 3.8–10.5)
WBC UR QL: 6 /HPF — HIGH (ref 0–5)

## 2024-10-02 NOTE — H&P PST ADULT - HISTORY OF PRESENT ILLNESS
77y/o female scheduled for right total knee arthroplasty on 10/9/2024.  Pt states, "right knee pain 7/2024 was seen in ER, c/o intermittent pain with rom, walking.  Xray shows bone on bone.  Expected outcome to be pain free."

## 2024-10-02 NOTE — H&P PST ADULT - PROBLEM SELECTOR PLAN 2
Postoperative Delirium Screen    Patient eligible for gentry risk screen age>75?  (if <= 74 then done) yes     Health care proxy paperwork given to patient? Yes (all patients should be given the packet to fill out at home and return on day of surgery to pre-op RN)    Impaired mobility (ie: uses cane, walker, wheelchair, or assist device)? no    Known dementia diagnosis? no    Impaired functional status (METS<4)? no    Malnutrition BMI<20? no

## 2024-10-02 NOTE — H&P PST ADULT - NSICDXPASTMEDICALHX_GEN_ALL_CORE_FT
PAST MEDICAL HISTORY:  Bilateral Cataracts     GERD (Gastroesophageal Reflux Disease)     Osteopenia      PAST MEDICAL HISTORY:  Bilateral Cataracts     Endometrial cancer     GERD (Gastroesophageal Reflux Disease)     Kyphosis     Osteoarthritis     Osteoporosis     S/P radiation therapy     Spinal stenosis

## 2024-10-02 NOTE — H&P PST ADULT - PROBLEM SELECTOR PLAN 1
Pt scheduled for right total knee replacement on 10/9/2024.  labs done results pending, bmp 7/1/ 2024 K 3.2-  repeated.  ekg in chart.    chlorhexidine provided-  written and verbal instructions given with teach back, pt able to verbalize understanding.  Preop teaching done, pt able to verbalize understanding.    medication day of procedure-  preop orders done.    anesthesia-  spinal stenosis, limited rom of neck, kyphosis

## 2024-10-02 NOTE — H&P PST ADULT - GASTROINTESTINAL
details… soft/nontender/nondistended/normal active bowel sounds/no guarding/no rigidity/no organomegaly/no palpable kim/no masses palpable

## 2024-10-02 NOTE — H&P PST ADULT - NSICDXPASTSURGICALHX_GEN_ALL_CORE_FT
PAST SURGICAL HISTORY:  Bilateral Cataracts repair 7 yrs ago     H/O: Hysterectomy - 1997     History of Colonoscopy and endoscopy May 2011     right ankle ORIF - 2009      PAST SURGICAL HISTORY:  Bilateral Cataracts repair 7 yrs ago     H/O: Hysterectomy - 1997     Hammer toe     History of Colonoscopy and endoscopy May 2011     right ankle ORIF - 2009     S/P hip replacement, left

## 2024-10-02 NOTE — H&P PST ADULT - MUSCULOSKELETAL
details… right foot/decreased ROM due to pain right knee pain with rom, arthritic deformity/ROM intact/normal gait/strength 5/5 bilateral upper extremities/strength 5/5 bilateral lower extremities/back exam

## 2024-10-03 LAB
MRSA PCR RESULT.: SIGNIFICANT CHANGE UP
S AUREUS DNA NOSE QL NAA+PROBE: SIGNIFICANT CHANGE UP

## 2024-10-06 LAB
-  AMOXICILLIN/CLAVULANIC ACID: SIGNIFICANT CHANGE UP
-  AMPICILLIN/SULBACTAM: SIGNIFICANT CHANGE UP
-  AMPICILLIN: SIGNIFICANT CHANGE UP
-  AZTREONAM: SIGNIFICANT CHANGE UP
-  CEFAZOLIN: SIGNIFICANT CHANGE UP
-  CEFEPIME: SIGNIFICANT CHANGE UP
-  CEFOXITIN: SIGNIFICANT CHANGE UP
-  CEFTRIAXONE: SIGNIFICANT CHANGE UP
-  CEFUROXIME: SIGNIFICANT CHANGE UP
-  CIPROFLOXACIN: SIGNIFICANT CHANGE UP
-  ERTAPENEM: SIGNIFICANT CHANGE UP
-  GENTAMICIN: SIGNIFICANT CHANGE UP
-  IMIPENEM: SIGNIFICANT CHANGE UP
-  LEVOFLOXACIN: SIGNIFICANT CHANGE UP
-  MEROPENEM: SIGNIFICANT CHANGE UP
-  NITROFURANTOIN: SIGNIFICANT CHANGE UP
-  PIPERACILLIN/TAZOBACTAM: SIGNIFICANT CHANGE UP
-  TOBRAMYCIN: SIGNIFICANT CHANGE UP
-  TRIMETHOPRIM/SULFAMETHOXAZOLE: SIGNIFICANT CHANGE UP
CULTURE RESULTS: ABNORMAL
METHOD TYPE: SIGNIFICANT CHANGE UP
ORGANISM # SPEC MICROSCOPIC CNT: ABNORMAL
ORGANISM # SPEC MICROSCOPIC CNT: ABNORMAL
SPECIMEN SOURCE: SIGNIFICANT CHANGE UP

## 2024-10-08 NOTE — ASU PATIENT PROFILE, ADULT - FALL HARM RISK - UNIVERSAL INTERVENTIONS
Bed in lowest position, wheels locked, appropriate side rails in place/Call bell, personal items and telephone in reach/Instruct patient to call for assistance before getting out of bed or chair/Non-slip footwear when patient is out of bed/[place] to call system/Physically safe environment - no spills, clutter or unnecessary equipment/Purposeful Proactive Rounding/Room/bathroom lighting operational, light cord in reach

## 2024-10-08 NOTE — ASU PATIENT PROFILE, ADULT - NSICDXPASTSURGICALHX_GEN_ALL_CORE_FT
PAST SURGICAL HISTORY:  Bilateral Cataracts repair 7 yrs ago     H/O: Hysterectomy - 1997     Hammer toe     History of Colonoscopy and endoscopy May 2011     right ankle ORIF - 2009     S/P hip replacement, left

## 2024-10-08 NOTE — ASU PATIENT PROFILE, ADULT - NSICDXPASTMEDICALHX_GEN_ALL_CORE_FT
PAST MEDICAL HISTORY:  Bilateral Cataracts     Endometrial cancer     GERD (Gastroesophageal Reflux Disease)     Kyphosis     Osteoarthritis     Osteoporosis     S/P radiation therapy     Spinal stenosis

## 2024-10-08 NOTE — ASU PATIENT PROFILE, ADULT - NSALCOHOLPROBLEMSRELYN_GEN_A_CORE_SD
Group Therapy Note    Date: 10/11/2023    Group Start Time: 1000  Group End Time: 4539  Group Topic: [place] Therapy Note    Mode of Intervention: Collaborative Gameplay    Group members split into teams, provided autonomy to name team. Teams competed to [employer] that [employer]", naming one song for each letter of the alphabet from the 62s, 76s, and 80s. Team members provided the name of the song and the artist.    Attendees: 9       Notes:  Pt did not attend session, was observed sleeping in bed at time of attempted invitation. Did not wake with verbal prompt to stir.       Discipline Responsible: Psychoeducational Specialist      Signature:  Shawn Chamorro, MM, MT-BC no

## 2024-10-09 ENCOUNTER — TRANSCRIPTION ENCOUNTER (OUTPATIENT)
Age: 79
End: 2024-10-09

## 2024-10-09 ENCOUNTER — INPATIENT (INPATIENT)
Facility: HOSPITAL | Age: 79
LOS: 0 days | Discharge: HOME HEALTH | End: 2024-10-10
Attending: ORTHOPAEDIC SURGERY | Admitting: ORTHOPAEDIC SURGERY
Payer: MEDICARE

## 2024-10-09 ENCOUNTER — APPOINTMENT (OUTPATIENT)
Dept: ORTHOPEDIC SURGERY | Facility: HOSPITAL | Age: 79
End: 2024-10-09

## 2024-10-09 VITALS
DIASTOLIC BLOOD PRESSURE: 68 MMHG | TEMPERATURE: 97 F | HEART RATE: 89 BPM | SYSTOLIC BLOOD PRESSURE: 152 MMHG | HEIGHT: 60 IN | OXYGEN SATURATION: 99 % | WEIGHT: 113.98 LBS | RESPIRATION RATE: 18 BRPM

## 2024-10-09 DIAGNOSIS — M17.11 UNILATERAL PRIMARY OSTEOARTHRITIS, RIGHT KNEE: ICD-10-CM

## 2024-10-09 DIAGNOSIS — Z96.642 PRESENCE OF LEFT ARTIFICIAL HIP JOINT: Chronic | ICD-10-CM

## 2024-10-09 DIAGNOSIS — M20.40 OTHER HAMMER TOE(S) (ACQUIRED), UNSPECIFIED FOOT: Chronic | ICD-10-CM

## 2024-10-09 LAB — GLUCOSE BLDC GLUCOMTR-MCNC: 92 MG/DL — SIGNIFICANT CHANGE UP (ref 70–99)

## 2024-10-09 PROCEDURE — 27447 TOTAL KNEE ARTHROPLASTY: CPT | Mod: RT

## 2024-10-09 PROCEDURE — S2900 ROBOTIC SURGICAL SYSTEM: CPT | Mod: NC

## 2024-10-09 PROCEDURE — 20985 CPTR-ASST DIR MS PX: CPT

## 2024-10-09 PROCEDURE — 73560 X-RAY EXAM OF KNEE 1 OR 2: CPT | Mod: 26,RT

## 2024-10-09 DEVICE — MAKO BONE PIN 3.2MM X 140MM: Type: IMPLANTABLE DEVICE | Site: RIGHT | Status: FUNCTIONAL

## 2024-10-09 DEVICE — COMP FEM TRIATHLON CR SZ3 RT: Type: IMPLANTABLE DEVICE | Site: RIGHT | Status: FUNCTIONAL

## 2024-10-09 DEVICE — INSERT TIB BEARING CS X3 SZ 3 11MM: Type: IMPLANTABLE DEVICE | Site: RIGHT | Status: FUNCTIONAL

## 2024-10-09 DEVICE — MAKO BONE PIN 3.2MM X 110MM: Type: IMPLANTABLE DEVICE | Site: RIGHT | Status: FUNCTIONAL

## 2024-10-09 DEVICE — IMP PATELLA ASYMMETRIC X3 29X9MM: Type: IMPLANTABLE DEVICE | Site: RIGHT | Status: FUNCTIONAL

## 2024-10-09 DEVICE — TRIATHLON TIBIAL COMP SZ 3: Type: IMPLANTABLE DEVICE | Site: RIGHT | Status: FUNCTIONAL

## 2024-10-09 RX ORDER — SODIUM CHLORIDE IRRIG SOLUTION 0.9 %
1000 SOLUTION, IRRIGATION IRRIGATION
Refills: 0 | Status: DISCONTINUED | OUTPATIENT
Start: 2024-10-09 | End: 2024-10-09

## 2024-10-09 RX ORDER — CEFAZOLIN SODIUM 1 G
2000 VIAL (EA) INJECTION EVERY 8 HOURS
Refills: 0 | Status: COMPLETED | OUTPATIENT
Start: 2024-10-09 | End: 2024-10-10

## 2024-10-09 RX ORDER — SODIUM CHLORIDE 0.9 % (FLUSH) 0.9 %
500 SYRINGE (ML) INJECTION ONCE
Refills: 0 | Status: COMPLETED | OUTPATIENT
Start: 2024-10-09 | End: 2024-10-10

## 2024-10-09 RX ORDER — ONDANSETRON HCL/PF 4 MG/2 ML
4 VIAL (ML) INJECTION EVERY 6 HOURS
Refills: 0 | Status: DISCONTINUED | OUTPATIENT
Start: 2024-10-09 | End: 2024-10-10

## 2024-10-09 RX ORDER — ACETAMINOPHEN 325 MG
975 TABLET ORAL EVERY 8 HOURS
Refills: 0 | Status: DISCONTINUED | OUTPATIENT
Start: 2024-10-10 | End: 2024-10-10

## 2024-10-09 RX ORDER — SODIUM CHLORIDE 0.9 % (FLUSH) 0.9 %
500 SYRINGE (ML) INJECTION ONCE
Refills: 0 | Status: COMPLETED | OUTPATIENT
Start: 2024-10-09 | End: 2024-10-09

## 2024-10-09 RX ORDER — OXYCODONE HYDROCHLORIDE 30 MG/1
10 TABLET, FILM COATED, EXTENDED RELEASE ORAL EVERY 4 HOURS
Refills: 0 | Status: DISCONTINUED | OUTPATIENT
Start: 2024-10-09 | End: 2024-10-10

## 2024-10-09 RX ORDER — ASPIRIN 325 MG
325 TABLET ORAL
Refills: 0 | Status: DISCONTINUED | OUTPATIENT
Start: 2024-10-09 | End: 2024-10-10

## 2024-10-09 RX ORDER — SENNOSIDES 8.6 MG
2 TABLET ORAL AT BEDTIME
Refills: 0 | Status: DISCONTINUED | OUTPATIENT
Start: 2024-10-09 | End: 2024-10-10

## 2024-10-09 RX ORDER — MAGNESIUM HYDROXIDE 400 MG/5ML
30 SUSPENSION, ORAL (FINAL DOSE FORM) ORAL DAILY
Refills: 0 | Status: DISCONTINUED | OUTPATIENT
Start: 2024-10-09 | End: 2024-10-10

## 2024-10-09 RX ORDER — IBANDRONATE SODIUM 150 MG/1
1 TABLET ORAL
Refills: 0 | DISCHARGE

## 2024-10-09 RX ORDER — ACETAMINOPHEN 325 MG
1000 TABLET ORAL ONCE
Refills: 0 | Status: COMPLETED | OUTPATIENT
Start: 2024-10-10 | End: 2024-10-10

## 2024-10-09 RX ORDER — KETOROLAC TROMETHAMINE 10 MG/1
15 TABLET, FILM COATED ORAL EVERY 6 HOURS
Refills: 0 | Status: DISCONTINUED | OUTPATIENT
Start: 2024-10-09 | End: 2024-10-10

## 2024-10-09 RX ORDER — CHLORHEXIDINE GLUCONATE ORAL RINSE 1.2 MG/ML
1 SOLUTION DENTAL ONCE
Refills: 0 | Status: COMPLETED | OUTPATIENT
Start: 2024-10-09 | End: 2024-10-09

## 2024-10-09 RX ORDER — PANTOPRAZOLE SODIUM 40 MG/1
40 TABLET, DELAYED RELEASE ORAL
Refills: 0 | Status: DISCONTINUED | OUTPATIENT
Start: 2024-10-09 | End: 2024-10-10

## 2024-10-09 RX ORDER — TRAMADOL HYDROCHLORIDE 50 MG/1
50 TABLET, COATED ORAL ONCE
Refills: 0 | Status: DISCONTINUED | OUTPATIENT
Start: 2024-10-09 | End: 2024-10-09

## 2024-10-09 RX ORDER — ACETAMINOPHEN 325 MG
1000 TABLET ORAL ONCE
Refills: 0 | Status: COMPLETED | OUTPATIENT
Start: 2024-10-09 | End: 2024-10-09

## 2024-10-09 RX ORDER — CELECOXIB 200 MG/1
200 CAPSULE ORAL EVERY 12 HOURS
Refills: 0 | Status: DISCONTINUED | OUTPATIENT
Start: 2024-10-11 | End: 2024-10-10

## 2024-10-09 RX ORDER — HYDROMORPHONE HYDROCHLORIDE 1 MG/ML
1 INJECTION, SOLUTION INTRAMUSCULAR; INTRAVENOUS; SUBCUTANEOUS
Refills: 0 | Status: DISCONTINUED | OUTPATIENT
Start: 2024-10-09 | End: 2024-10-09

## 2024-10-09 RX ORDER — TRAMADOL HYDROCHLORIDE 50 MG/1
50 TABLET, COATED ORAL EVERY 6 HOURS
Refills: 0 | Status: DISCONTINUED | OUTPATIENT
Start: 2024-10-09 | End: 2024-10-10

## 2024-10-09 RX ORDER — OXYCODONE HYDROCHLORIDE 30 MG/1
5 TABLET, FILM COATED, EXTENDED RELEASE ORAL EVERY 4 HOURS
Refills: 0 | Status: DISCONTINUED | OUTPATIENT
Start: 2024-10-09 | End: 2024-10-10

## 2024-10-09 RX ORDER — HYDROMORPHONE HYDROCHLORIDE 1 MG/ML
0.5 INJECTION, SOLUTION INTRAMUSCULAR; INTRAVENOUS; SUBCUTANEOUS
Refills: 0 | Status: DISCONTINUED | OUTPATIENT
Start: 2024-10-09 | End: 2024-10-09

## 2024-10-09 RX ORDER — PANTOPRAZOLE SODIUM 40 MG/1
40 TABLET, DELAYED RELEASE ORAL ONCE
Refills: 0 | Status: COMPLETED | OUTPATIENT
Start: 2024-10-09 | End: 2024-10-09

## 2024-10-09 RX ADMIN — Medication 30 MILLILITER(S): at 07:20

## 2024-10-09 RX ADMIN — Medication 400 MILLIGRAM(S): at 18:44

## 2024-10-09 RX ADMIN — Medication 500 MILLILITER(S): at 14:58

## 2024-10-09 RX ADMIN — Medication 2 TABLET(S): at 21:45

## 2024-10-09 RX ADMIN — Medication 100 MILLIGRAM(S): at 16:34

## 2024-10-09 RX ADMIN — Medication 500 MILLILITER(S): at 10:50

## 2024-10-09 RX ADMIN — Medication 17 GRAM(S): at 21:45

## 2024-10-09 RX ADMIN — KETOROLAC TROMETHAMINE 15 MILLIGRAM(S): 10 TABLET, FILM COATED ORAL at 22:54

## 2024-10-09 RX ADMIN — Medication 325 MILLIGRAM(S): at 18:44

## 2024-10-09 RX ADMIN — PANTOPRAZOLE SODIUM 40 MILLIGRAM(S): 40 TABLET, DELAYED RELEASE ORAL at 07:21

## 2024-10-09 RX ADMIN — KETOROLAC TROMETHAMINE 15 MILLIGRAM(S): 10 TABLET, FILM COATED ORAL at 17:22

## 2024-10-09 RX ADMIN — TRAMADOL HYDROCHLORIDE 50 MILLIGRAM(S): 50 TABLET, COATED ORAL at 07:21

## 2024-10-09 RX ADMIN — KETOROLAC TROMETHAMINE 15 MILLIGRAM(S): 10 TABLET, FILM COATED ORAL at 16:31

## 2024-10-09 RX ADMIN — CHLORHEXIDINE GLUCONATE ORAL RINSE 1 APPLICATION(S): 1.2 SOLUTION DENTAL at 07:20

## 2024-10-09 RX ADMIN — KETOROLAC TROMETHAMINE 15 MILLIGRAM(S): 10 TABLET, FILM COATED ORAL at 21:54

## 2024-10-09 NOTE — DISCHARGE NOTE PROVIDER - NSDCFUSCHEDAPPT_GEN_ALL_CORE_FT
Leyla Castillo  [facility]  ORTHOSURG [address] [facility]  Scheduled Appointment: 10/23/2024

## 2024-10-09 NOTE — PROGRESS NOTE ADULT - ASSESSMENT
Assessment: 78y y/o Female s/p R total knee arthroplasty, POD #0    Plan:  - Pain control  - Antibiotic - Ancef postop  - Incentive Spirometry  - DVT prophylaxis: Venodynes/Aspirin 325mg BID  - F/U AM Labs  - PT/OT/WBAT  - Notify Orthopedics with any questions

## 2024-10-09 NOTE — DISCHARGE NOTE PROVIDER - NSDCCPCAREPLAN_GEN_ALL_CORE_FT
PRINCIPAL DISCHARGE DIAGNOSIS  Diagnosis: Osteoarthritis  Assessment and Plan of Treatment: Diet: Continue regular diet upon discharge.   Activity: No heavy lifting > 25 lbs for 4 weeks. Avoid straining or excessive activity x 6 weeks.   -Continue to use your walker when ambulating until your postoperative follow up appointment.   Dressings: Keep dressing clean, dry, and intact. Your doctor will remove your bandage at your post-operative follow up appointment.   Other Care:   -You may shower when you get home but DO NOT soak dressing and/or incision. The water may run over your dressing/incision but DO NOT let the water directly hit your dressing/incision (take a shower with your wound away from the direct stream of water). NO hot tubes, NO bath tubs, NO swimming pools.   -Elevate your operative leg 2 feet above heart level for 2 hours in the morning, 2 hours in the afternoon, and 2 hours in the evening.   -Apply ice for 20min every time you elevate.   -Sit for 90 min/day: 45mins x2 or 30min x3  -DO NOT sit for more than the 90min/day. Walk or lay down when not elevating your leg.   -DO NOT place the elevation pillow behind your knees. Only place it under your calf and heel.   -DO NOT bend more than 45 degrees at the waist

## 2024-10-09 NOTE — OCCUPATIONAL THERAPY INITIAL EVALUATION ADULT - NS ASR FOLLOW COMMAND OT EVAL
Problem: Pain  Goal: Verbalizes/displays adequate comfort level or baseline comfort level  Outcome: Progressing     Problem: Safety - Adult  Goal: Free from fall injury  Outcome: Progressing     Problem: Discharge Planning  Goal: Discharge to home or other facility with appropriate resources  Outcome: Progressing     Problem: ABCDS Injury Assessment  Goal: Absence of physical injury  Outcome: Progressing     Will continue to monitor patient 100% of the time/able to follow multistep instructions

## 2024-10-09 NOTE — DISCHARGE NOTE PROVIDER - NSDCCPTREATMENT_GEN_ALL_CORE_FT
PRINCIPAL PROCEDURE  Procedure: Total knee arthroplasty  Findings and Treatment: Pain control:   Standing:         -Acetaminophen 500mg - 2 tabs every 8 hours  As needed:        -Tramadol 50mg - 1 tab every 6-8 hours - Take only if needed for MODERATE pain       -oxycodone 5mg - 1 tab every 4-6 hours - Take only if needed for SEVERE or BREAKTHROUGH pain  Oxycodone and Tramadol have been sent to your pharmacy. Please do not drive, operate machinery, or make important decisions while taking these medications.   Other Medications: (Standing)  -Aspirin (Enteric Coated) 325mg every 12 hours - to prevent blood clots (for 6 weeks post operatively.)  -Protonix 40mg - 1 tab every 24 hours - to prevent stomach irritation/ulcers  -Senna 8.6mg - 2 pills every 24 hours - stool softener  -Miralax 17g - daily - constipation   Follow up: Please follow up at your prescheduled post-operative follow up appointment with Dr. Castillo for 2 weeks after hospital discharge. Please call with any questions or concerns including fevers, worsening pain, pus from the wounds, redness of the skin and difficulty breathing or heaviness in the chest at [phone].

## 2024-10-09 NOTE — PATIENT PROFILE ADULT - FUNCTIONAL ASSESSMENT - BASIC MOBILITY 5.
HPI:  Presents for f/u ADHD, other    resp illness is improved. resp status stable. ADHD controlled on current regimen  Fair appetite despite meds    No adverse meds effects      Past medical, Social, and Family history reviewed    Prior to Admission medications    Medication Sig Start Date End Date Taking? Authorizing Provider   dextroamphetamine-amphetamine (ADDERALL) 15 mg tablet Take 1 Tab (15 mg total) by mouth dailyEarliest Fill Date: 3/1/18. With evening classes/work Max Daily Amount: 15 mg 3/1/18 3/30/18 Yes Cortes Espinoza MD   dextroamphetamine-amphetamine (ADDERALL) 15 mg tablet Take 1 Tab (15 mg total) by mouth dailyEarliest Fill Date: 3/31/18. With evening classes/work Max Daily Amount: 15 mg 3/31/18 4/29/18 Yes Cortes Espinoza MD   dextroamphetamine-amphetamine (ADDERALL) 15 mg tablet Take 1 Tab (15 mg total) by mouth dailyEarliest Fill Date: 4/30/18. With evening classes/work Max Daily Amount: 15 mg 4/30/18 6/28/18 Yes Cortes Espinoza MD   amphetamine-dextroamphetamine XR (ADDERALL XR) 30 mg XR capsule Take 1 Cap (30 mg total) by mouth dailyEarliest Fill Date: 3/1/18. Max Daily Amount: 30 mg 3/1/18 3/30/18 Yes Cortes Espinoza MD   amphetamine-dextroamphetamine XR (ADDERALL XR) 30 mg XR capsule Take 1 Cap (30 mg total) by mouth dailyEarliest Fill Date: 3/31/18. Max Daily Amount: 30 mg 3/31/18 4/29/18 Yes Cortes Espinoza MD   amphetamine-dextroamphetamine XR (ADDERALL XR) 30 mg XR capsule Take 1 Cap (30 mg total) by mouth dailyEarliest Fill Date: 4/30/18. Max Daily Amount: 30 mg 4/30/18 5/29/18 Yes Cortes Espinoza MD   dextroamphetamine-amphetamine (ADDERALL) 15 mg tablet Take 1 Tab (15 mg total) by mouth daily as needed. With evening classes/work 12/1/17  Yes Cortes Espinoza MD   fluticasone [facility]) 50 mcg/actuation nasal spray 2 Sprays by Both Nostrils route daily. 6/5/17  Yes Cortes Espinoza MD   fexofenadine (ALLEGRA) 180 mg tablet Take 1 Tab by mouth daily.  6/5/17 Yes Ubaldo Villagomez MD   amphetamine-dextroamphetamine XR (ADDERALL XR) 30 mg XR capsule Take 1 Cap (30 mg total) by mouth dailyEarliest Fill Date: 8/5/17. Max Daily Amount: 30 mg 8/5/17  Yes Ubaldo Villagomez MD   amphetamine-dextroamphetamine XR (ADDERALL XR) 30 mg XR capsule Take 1 Cap (30 mg total) by mouth dailyEarliest Fill Date: 7/5/17. Max Daily Amount: 30 mg 7/5/17  Yes Ubaldo Villagomez MD   amphetamine-dextroamphetamine XR (ADDERALL XR) 30 mg XR capsule Take 1 Cap (30 mg total) by mouth daily. Max Daily Amount: 30 mg 6/5/17  Yes Ubaldo Villagomez MD   Omeprazole delayed release (PRILOSEC D/R) 20 mg tablet Take 1 Tab by mouth daily. 1/6/16  Yes Ubaldo Villagomez MD   cyanocobalamin (B-12 DOTS) 500 mcg tablet Take 1 Tab by mouth daily. 6/2/15  Yes Ubaldo Villagomez MD   cholecalciferol (VITAMIN D3) 1,000 unit tablet Take 1 Tab by mouth daily. 6/2/15  Yes Ubaldo Villagomez MD          ROS  Complete ROS reviewed and negative or stable except as noted in HPI. Physical Exam   Constitutional: He is oriented to person, place, and time. He appears well-nourished. No distress. HENT:   Head: Normocephalic and atraumatic. Eyes: EOM are normal. Pupils are equal, round, and reactive to light. No scleral icterus. Neck: Normal range of motion. Neck supple. No JVD present. Cardiovascular: Normal rate, regular rhythm and normal heart sounds. Exam reveals no gallop and no friction rub. No murmur heard. Pulmonary/Chest: Effort normal and breath sounds normal. No respiratory distress. He has no wheezes. He has no rales. Abdominal: Soft. Bowel sounds are normal. He exhibits no distension. There is no tenderness. Musculoskeletal: Normal range of motion. He exhibits no edema. Lymphadenopathy:     He has no cervical adenopathy. Neurological: He is alert and oriented to person, place, and time. He exhibits normal muscle tone. Skin: Skin is warm. No rash noted.    Psychiatric: He has a normal mood and affect. Nursing note and vitals reviewed. Prior labs reviewed. Assessment/Plan:    ICD-10-CM ICD-9-CM    1. Attention deficit disorder, unspecified hyperactivity presence F98.8 314.00 dextroamphetamine-amphetamine (ADDERALL) 15 mg tablet      dextroamphetamine-amphetamine (ADDERALL) 15 mg tablet      dextroamphetamine-amphetamine (ADDERALL) 15 mg tablet      amphetamine-dextroamphetamine XR (ADDERALL XR) 30 mg XR capsule      amphetamine-dextroamphetamine XR (ADDERALL XR) 30 mg XR capsule      amphetamine-dextroamphetamine XR (ADDERALL XR) 30 mg XR capsule   2. Allergic rhinitis, unspecified chronicity, unspecified seasonality, unspecified trigger J30.9 477.9    3. Gastroesophageal reflux disease without esophagitis K21.9 530.81      Follow-up Disposition:  Return in about 3 months (around 6/1/2018), or if symptoms worsen or fail to improve, for ADHD.   results and schedule of future studies reviewed with patient  reviewed diet, exercise and weight   cardiovascular risk and specific lipid/LDL goals reviewed  reviewed medications and side effects in detail  Continue current medications 4 = No assist / stand by assistance

## 2024-10-09 NOTE — PHYSICAL THERAPY INITIAL EVALUATION ADULT - PERTINENT HX OF CURRENT PROBLEM, REHAB EVAL
78 year old female scheduled for right total knee arthroplasty on 10/9/2024.  Pt states, "right knee pain 7/2024 was seen in ER, complaining of intermittent pain with rom, walking.  Xray shows bone on bone.  Expected outcome to be pain free."

## 2024-10-09 NOTE — DISCHARGE NOTE PROVIDER - CARE PROVIDER_API CALL
Leyla Castillo  Orthopaedic Surgery  [address], [address]  [place] [address]  Phone: [phone]  Fax: [phone]  Established Patient  Follow Up Time:

## 2024-10-09 NOTE — OCCUPATIONAL THERAPY INITIAL EVALUATION ADULT - GENERAL OBSERVATIONS, REHAB EVAL
Patient received semisupine in bed in NAD; agreeable to participate in OT evaluation. +IV. +right knee dressing C/D/I. HR 70 bpm. I, Woodrow Dubois, performed the initial face to face bedside interview with this patient regarding history of present illness, review of symptoms and relevant past medical, social and family history.  I completed an independent physical examination.  I was the initial provider who evaluated this patient. I have signed out the follow up of any pending tests (i.e. labs, radiological studies) to the ACP.  I have communicated the patient’s plan of care and disposition with the ACP.  The history, relevant review of systems, past medical and surgical history, medical decision making, and physical examination was documented by the scribe in my presence and I attest to the accuracy of the documentation.

## 2024-10-09 NOTE — PHYSICAL THERAPY INITIAL EVALUATION ADULT - RANGE OF MOTION EXAMINATION, REHAB EVAL
Right knee 0-90 degrees/bilateral upper extremity ROM was WFL (within functional limits)/bilateral lower extremity ROM was WFL (within functional limits)

## 2024-10-09 NOTE — DISCHARGE NOTE PROVIDER - HOSPITAL COURSE
This is a 79yo Female with PMH of endometrial CA s/p XRT and GERD who presents to [facility] for orthopedic surgery. Patient s/p right TKA with Dr. Castillo on 10/9/2024. Patient tolerated the procedure well without any intraoperative complications. Patient tolerated physical therapy well, and the pain was controlled. Patient is weight bearing as tolerated with cane/walker as needed. Seen by medical attending for continuity of care and management and cleared for safe discharge. Keep dressing/incision clean, dry and intact. Any suture/staples to be removed on post-op day #14 at your office visit. Patient is on 325mg of ASA for DVT prophylaxis, please take for 6 weeks unless otherwise instructed by your surgeon. Please follow up with Dr. Castillo in 2 weeks. Please follow up with your PMD for continuity of care and management as medications may have changed.

## 2024-10-09 NOTE — PHYSICAL THERAPY INITIAL EVALUATION ADULT - ADDITIONAL COMMENTS
Patient lives with sister in private home, 4 steps to enter and flight to bedroom/bathroom. Patient was independent in all ADL prior to admission. Patient was not using an assistive device but owns a walker, cane and shower bench.

## 2024-10-09 NOTE — DISCHARGE NOTE PROVIDER - NSDCMRMEDTOKEN_GEN_ALL_CORE_FT
ibandronate 150 mg oral tablet: 1 tab(s) orally once a month last dose one month ago  omeprazole 20 mg oral delayed release capsule: 1 cap(s) orally once a day   acetaminophen 325 mg oral tablet: 3 tab(s) orally every 8 hours MDD: 9  aspirin 325 mg oral delayed release tablet: 1 tab(s) orally 2 times a day MDD: 2  ibandronate 150 mg oral tablet: 1 tab(s) orally once a month last dose one month ago  naproxen 500 mg oral tablet: 1 tab(s) orally 2 times a day MDD: 2  Narcan 4 mg/0.1 mL nasal spray: 1 spray(s) intranasally once MDD: 1  oxyCODONE 5 mg oral tablet: 1 tab(s) orally every 4 hours as needed for Moderate Pain (4 - 6) May take 1-2 tab orally as needed for pain MDD: 6  pantoprazole 40 mg oral delayed release tablet: 1 tab(s) orally once a day (before a meal) MDD: 1  senna leaf extract oral tablet: 2 tab(s) orally once a day (at bedtime) MDD: 2  traMADol 50 mg oral tablet: 1 tab(s) orally every 8 hours as needed for Mild Pain (1 - 3) MDD: 3   acetaminophen 325 mg oral tablet: 3 tab(s) orally every 8 hours MDD: 9  aspirin 325 mg oral delayed release tablet: 1 tab(s) orally 2 times a day MDD: 2  ibandronate 150 mg oral tablet: 1 tab(s) orally once a month last dose one month ago  naproxen 500 mg oral tablet: 1 tab(s) orally 2 times a day MDD: 2  oxyCODONE 5 mg oral tablet: 1 tab(s) orally every 4 hours as needed for Moderate Pain (4 - 6) May take 1-2 tab orally as needed for pain MDD: 6  pantoprazole 40 mg oral delayed release tablet: 1 tab(s) orally once a day (before a meal) MDD: 1  senna leaf extract oral tablet: 2 tab(s) orally once a day (at bedtime) MDD: 2  traMADol 50 mg oral tablet: 1 tab(s) orally every 8 hours as needed for Mild Pain (1 - 3) MDD: 3

## 2024-10-10 ENCOUNTER — TRANSCRIPTION ENCOUNTER (OUTPATIENT)
Age: 79
End: 2024-10-10

## 2024-10-10 DIAGNOSIS — K21.9 GASTRO-ESOPHAGEAL REFLUX DISEASE WITHOUT ESOPHAGITIS: ICD-10-CM

## 2024-10-10 DIAGNOSIS — C54.1 MALIGNANT NEOPLASM OF ENDOMETRIUM: ICD-10-CM

## 2024-10-10 DIAGNOSIS — D50.0 IRON DEFICIENCY ANEMIA SECONDARY TO BLOOD LOSS (CHRONIC): ICD-10-CM

## 2024-10-10 LAB
ANION GAP SERPL CALC-SCNC: 10 MMOL/L — SIGNIFICANT CHANGE UP (ref 7–14)
BUN SERPL-MCNC: 17 MG/DL — SIGNIFICANT CHANGE UP (ref 7–23)
CALCIUM SERPL-MCNC: 8.6 MG/DL — SIGNIFICANT CHANGE UP (ref 8.4–10.5)
CHLORIDE SERPL-SCNC: 104 MMOL/L — SIGNIFICANT CHANGE UP (ref 98–107)
CO2 SERPL-SCNC: 24 MMOL/L — SIGNIFICANT CHANGE UP (ref 22–31)
CREAT SERPL-MCNC: 0.58 MG/DL — SIGNIFICANT CHANGE UP (ref 0.5–1.3)
EGFR: 91 ML/MIN/1.73M2 — SIGNIFICANT CHANGE UP
GLUCOSE SERPL-MCNC: 90 MG/DL — SIGNIFICANT CHANGE UP (ref 70–99)
HCT VFR BLD CALC: 32.4 % — LOW (ref 34.5–45)
HGB BLD-MCNC: 10.3 G/DL — LOW (ref 11.5–15.5)
MCHC RBC-ENTMCNC: 27.1 PG — SIGNIFICANT CHANGE UP (ref 27–34)
MCHC RBC-ENTMCNC: 31.8 GM/DL — LOW (ref 32–36)
MCV RBC AUTO: 85.3 FL — SIGNIFICANT CHANGE UP (ref 80–100)
NRBC # BLD: 0 /100 WBCS — SIGNIFICANT CHANGE UP (ref 0–0)
NRBC # FLD: 0 K/UL — SIGNIFICANT CHANGE UP (ref 0–0)
PLATELET # BLD AUTO: 237 K/UL — SIGNIFICANT CHANGE UP (ref 150–400)
POTASSIUM SERPL-MCNC: 3.6 MMOL/L — SIGNIFICANT CHANGE UP (ref 3.5–5.3)
POTASSIUM SERPL-SCNC: 3.6 MMOL/L — SIGNIFICANT CHANGE UP (ref 3.5–5.3)
RBC # BLD: 3.8 M/UL — SIGNIFICANT CHANGE UP (ref 3.8–5.2)
RBC # FLD: 14.9 % — HIGH (ref 10.3–14.5)
SODIUM SERPL-SCNC: 138 MMOL/L — SIGNIFICANT CHANGE UP (ref 135–145)
WBC # BLD: 6.15 K/UL — SIGNIFICANT CHANGE UP (ref 3.8–10.5)
WBC # FLD AUTO: 6.15 K/UL — SIGNIFICANT CHANGE UP (ref 3.8–10.5)

## 2024-10-10 PROCEDURE — 99223 1ST HOSP IP/OBS HIGH 75: CPT

## 2024-10-10 RX ORDER — ASPIRIN 325 MG
1 TABLET ORAL
Qty: 84 | Refills: 0
Start: 2024-10-10 | End: 2024-11-20

## 2024-10-10 RX ORDER — ACETAMINOPHEN 325 MG
3 TABLET ORAL
Qty: 63 | Refills: 0
Start: 2024-10-10 | End: 2024-10-16

## 2024-10-10 RX ORDER — NALOXONE HYDROCHLORIDE 0.4 MG/ML
1 INJECTION, SOLUTION INTRAMUSCULAR; INTRAVENOUS; SUBCUTANEOUS
Qty: 1 | Refills: 0
Start: 2024-10-10 | End: 2024-10-10

## 2024-10-10 RX ORDER — OXYCODONE HYDROCHLORIDE 30 MG/1
1 TABLET, FILM COATED, EXTENDED RELEASE ORAL
Qty: 42 | Refills: 0
Start: 2024-10-10 | End: 2024-10-16

## 2024-10-10 RX ORDER — PANTOPRAZOLE SODIUM 40 MG/1
1 TABLET, DELAYED RELEASE ORAL
Qty: 30 | Refills: 0
Start: 2024-10-10 | End: 2024-11-08

## 2024-10-10 RX ORDER — SENNOSIDES 8.6 MG
2 TABLET ORAL
Qty: 28 | Refills: 0
Start: 2024-10-10 | End: 2024-10-23

## 2024-10-10 RX ORDER — TRAMADOL HYDROCHLORIDE 50 MG/1
1 TABLET, COATED ORAL
Qty: 21 | Refills: 0
Start: 2024-10-10 | End: 2024-10-16

## 2024-10-10 RX ADMIN — KETOROLAC TROMETHAMINE 15 MILLIGRAM(S): 10 TABLET, FILM COATED ORAL at 04:50

## 2024-10-10 RX ADMIN — Medication 400 MILLIGRAM(S): at 01:21

## 2024-10-10 RX ADMIN — Medication 325 MILLIGRAM(S): at 05:03

## 2024-10-10 RX ADMIN — Medication 500 MILLILITER(S): at 05:04

## 2024-10-10 RX ADMIN — Medication 101.6 MILLIGRAM(S): at 05:04

## 2024-10-10 RX ADMIN — Medication 400 MILLIGRAM(S): at 09:39

## 2024-10-10 RX ADMIN — Medication 100 MILLIGRAM(S): at 01:21

## 2024-10-10 RX ADMIN — Medication 1000 MILLIGRAM(S): at 10:26

## 2024-10-10 RX ADMIN — Medication 1000 MILLIGRAM(S): at 02:21

## 2024-10-10 RX ADMIN — KETOROLAC TROMETHAMINE 15 MILLIGRAM(S): 10 TABLET, FILM COATED ORAL at 05:50

## 2024-10-10 RX ADMIN — KETOROLAC TROMETHAMINE 15 MILLIGRAM(S): 10 TABLET, FILM COATED ORAL at 12:00

## 2024-10-10 RX ADMIN — PANTOPRAZOLE SODIUM 40 MILLIGRAM(S): 40 TABLET, DELAYED RELEASE ORAL at 05:03

## 2024-10-10 RX ADMIN — KETOROLAC TROMETHAMINE 15 MILLIGRAM(S): 10 TABLET, FILM COATED ORAL at 13:00

## 2024-10-10 NOTE — DISCHARGE NOTE NURSING/CASE MANAGEMENT/SOCIAL WORK - NSDCPECAREGIVERED_GEN_ALL_CORE
knee replacement  oxy ir  tramadol  caring for my incision  cold compression therapy  exercise worksheet  pain management

## 2024-10-10 NOTE — DISCHARGE NOTE NURSING/CASE MANAGEMENT/SOCIAL WORK - NSDCPNINST_GEN_ALL_CORE
You have a post op appointment on  Maintain knee incision and dressing clean, dry, and intact. Call MD with any signs of infection, ie: fever greater than 100.4, redness, drainage, or with signs of bleeding, unrelieved increased pain, or persistent nausea. Continue to drink plenty of fluids. Avoid strenuous activity and constipation which may be a side effect from taking certain medications and narcotics. Total knee replacement precautions. Safety and fall prevention measures reinforced.  You have a post op appointment on October 23, 2024 @ 11:30am in the [address] [place] [address]. office. If you are unable to keep this appointment, please call the office to reschedule.   Maintain knee incision and dressing clean, dry, and intact. Call MD with any signs of infection, ie: fever greater than 100.4, redness, drainage, or with signs of bleeding, unrelieved increased pain, or persistent nausea. Continue to drink plenty of fluids. Avoid strenuous activity and constipation which may be a side effect from taking certain medications and narcotics. Total knee replacement precautions. Safety and fall prevention measures reinforced.

## 2024-10-10 NOTE — DISCHARGE NOTE NURSING/CASE MANAGEMENT/SOCIAL WORK - NSSCTYPOFSERV_GEN_ALL_CORE
A physical therapist is anticipated to visit you at home the day after your hospital discharge; the above home health agency will contact you to arrange the time of the initial P.T. visit.

## 2024-10-10 NOTE — DISCHARGE NOTE NURSING/CASE MANAGEMENT/SOCIAL WORK - NSDCPEFALRISK_GEN_ALL_CORE
For information on Fall & Injury Prevention, visit: [email or web address] OR  [email or web address] OR  https://www.cdc.gov/steadi/patient.html

## 2024-10-10 NOTE — CONSULT NOTE ADULT - SUBJECTIVE AND OBJECTIVE BOX
[facility] Division of Hospital Medicine  Cornell Eason MD  Contact via [employer] Teams (Mon-Fri 8AM-4PM)  Otherwise: contact Hospitalist in Charge at [phone]    Patient is a 78y old  Female who presents with a chief complaint of s/p right TKA 10/9/24 (09 Oct 2024 11:39)      HPI:  78F Endometrial CA s/p CHELSEA, GERD, p/w OA s/p Rt TKA on 10/8 post-op anemia. Pain is rated 4/10; localized at surgical site, achy in nature, non-radiating, worse with movement, but improved with pain meds.   Patient was able to navigate the stairs with PT.    No F/C, N/V, CP, SOB, Cough, lightheadedness, dizziness, abdominal pain, diarrhea, dysuria.    Pain Symptoms if applicable:             	                         none	   mild         moderate         severe  Pain:	            4                0	    1-3	     4-6	         7-10  Location:	Surgical site  Modifying factors:	Worse with movement  Associated symptoms:	    Allergies    penicillins (Hives)    Intolerances        HOME MEDICATIONS: Reviewed    MEDICATIONS  (STANDING):  acetaminophen     Tablet .. 975 milliGRAM(s) Oral every 8 hours  aspirin enteric coated 325 milliGRAM(s) Oral two times a day  pantoprazole    Tablet 40 milliGRAM(s) Oral before breakfast  polyethylene glycol 3350 17 Gram(s) Oral at bedtime  senna 2 Tablet(s) Oral at bedtime    MEDICATIONS  (PRN):  magnesium hydroxide Suspension 30 milliLiter(s) Oral daily PRN Constipation  ondansetron Injectable 4 milliGRAM(s) IV Push every 6 hours PRN Nausea and/or Vomiting  oxyCODONE    IR 5 milliGRAM(s) Oral every 4 hours PRN Moderate Pain (4 - 6)  oxyCODONE    IR 10 milliGRAM(s) Oral every 4 hours PRN Severe Pain (7 - 10)  traMADol 50 milliGRAM(s) Oral every 6 hours PRN Mild Pain (1 - 3)      PAST MEDICAL & SURGICAL HISTORY:  GERD (Gastroesophageal Reflux Disease)      Bilateral Cataracts      Osteoarthritis      Endometrial cancer      S/P radiation therapy      Osteoporosis      Spinal stenosis      Kyphosis      right ankle ORIF - 2009      H/O: Hysterectomy - 1997      Bilateral Cataracts repair 7 yrs ago      History of Colonoscopy and endoscopy May 2011      S/P hip replacement, left      Hammer toe          SOCIAL HISTORY:  No tobacco/alcohol use/abuse.    FAMILY HISTORY:  No pertinent family history in first degree relatives        REVIEW OF SYSTEMS:    CONSTITUTIONAL: No fever, weight loss, or fatigue  EYES: No eye pain, visual disturbances, or discharge  ENMT:  No difficulty hearing, tinnitus, vertigo; No sinus or throat pain  NECK: No pain or stiffness  RESPIRATORY: No cough, wheezing, chills or hemoptysis; No shortness of breath  CARDIOVASCULAR: No chest pain, palpitations, dizziness, or leg swelling  GASTROINTESTINAL: No abdominal or epigastric pain. No nausea, vomiting, or hematemesis; No diarrhea or constipation. No melena or hematochezia.  GENITOURINARY: No dysuria, frequency, hematuria, or incontinence  NEUROLOGICAL: No headaches, memory loss, loss of strength, numbness, or tremors  SKIN: No itching, burning, rashes, or lesions   LYMPH NODES: No enlarged glands  ENDOCRINE: No heat or cold intolerance; No hair loss  MUSCULOSKELETAL: Rt knee pain  PSYCHIATRIC: No depression, anxiety, mood swings, or difficulty sleeping  HEME/LYMPH: No easy bruising, or bleeding gums  ALLERGY AND IMMUNOLOGIC: No hives or eczema    [] Unable to obtain due to poor mental status    Vital Signs Last 24 Hrs  T(C): 37.1 (10 Oct 2024 10:00), Max: 37.2 (10 Oct 2024 02:13)  T(F): 98.7 (10 Oct 2024 10:00), Max: 99 (10 Oct 2024 02:13)  HR: 82 (10 Oct 2024 10:00) (62 - 82)  BP: 126/62 (10 Oct 2024 10:00) (126/62 - 167/84)  BP(mean): 100 (09 Oct 2024 13:00) (100 - 108)  RR: 18 (10 Oct 2024 10:00) (13 - 20)  SpO2: 100% (10 Oct 2024 10:00) (94% - 100%)    Parameters below as of 10 Oct 2024 10:00  Patient On (Oxygen Delivery Method): room air      CAPILLARY BLOOD GLUCOSE          PHYSICAL EXAM:    CONSTITUTIONAL: NAD, frail  EYES: PERRLA; conjunctiva and sclera clear  ENMT: Moist oral mucosa, no pharyngeal injection or exudates; normal dentition  NECK: Supple, no palpable masses; no thyromegaly  RESPIRATORY: Normal respiratory effort; lungs are clear to auscultation bilaterally  CARDIOVASCULAR: Regular rate and rhythm, normal S1 and S2, no murmur/rub/gallop; No lower extremity edema; Peripheral pulses are 2+ bilaterally  ABDOMEN: Nontender to palpation, normoactive bowel sounds, no rebound/guarding; No hepatosplenomegaly  MUSCULOSKELETAL:  Did not assess gait; no clubbing or cyanosis of digits; no joint swelling or tenderness to palpation  PSYCH: A+O to person, place, and time; affect appropriate  NEUROLOGY: CN 2-12 are intact and symmetric; no gross sensory deficits   SKIN: No rashes; no palpable lesions, surgical dressing C/D/I    LABS:                        10.3   6.15  )-----------( 237      ( 10 Oct 2024 05:39 )             32.4     10-10    138  |  104  |  17  ----------------------------<  90  3.6   |  24  |  0.58    Ca    8.6      10 Oct 2024 05:39        Urinalysis Basic - ( 10 Oct 2024 05:39 )    Color: x / Appearance: x / SG: x / pH: x  Gluc: 90 mg/dL / Ketone: x  / Bili: x / Urobili: x   Blood: x / Protein: x / Nitrite: x   Leuk Esterase: x / RBC: x / WBC x   Sq Epi: x / Non Sq Epi: x / Bacteria: x      CAPILLARY BLOOD GLUCOSE      POCT Blood Glucose.: 92 mg/dL (09 Oct 2024 06:53)      RADIOLOGY & ADDITIONAL STUDIES:    Imaging:   Personally Reviewed:  [ ] YES               EKG:   Personally Reviewed:  [ ] YES       Care Discussed with Consultant(s)/Other Providers:  Care Discussed with Primary Team.      [ ] Increased delirium risk  [ ] Delirium and other risks can be reduced by:          -early ambulation          -minimizing "tethers" - IV, oxygen, catheters, etc          -avoiding hypnotics and sedatives          -maintaining hydration/nutrition          -avoid anticholinergics - diphenhydramine, etc          -pain control          -supportive environment

## 2024-10-10 NOTE — DISCHARGE NOTE NURSING/CASE MANAGEMENT/SOCIAL WORK - PATIENT PORTAL LINK FT
You can access the FollowMyHealth Patient Portal offered by [facility] by registering at the following website: [email or web address]. By joining [employer]’s FollowMyHealth portal, you will also be able to view your health information using other applications (apps) compatible with our system.

## 2024-10-10 NOTE — DISCHARGE NOTE NURSING/CASE MANAGEMENT/SOCIAL WORK - NSDCVIVACCINE_GEN_ALL_CORE_FT
Tdap; 22-Mar-2020 11:05; Honey Alvarez (RN); [employer]; [id number] (Exp. Date: 19-Mar-2022); IntraMuscular; Deltoid Left.; 0.5 milliLiter(s); VIS (VIS Published: 09-May-2013, VIS Presented: 22-Mar-2020);

## 2024-10-10 NOTE — PROGRESS NOTE ADULT - SUBJECTIVE AND OBJECTIVE BOX
ORTHO PROGRESS NOTE     Pt seen and examined at bedside, denies SOB, CP, Dizziness. N/V/D /HA.  No significant overnight events. Pain well controlled. Patient ambulated  with PT     Vital Signs Last 24 Hrs  T(C): 36.9 (10 Oct 2024 05:12), Max: 37.2 (10 Oct 2024 02:13)  T(F): 98.5 (10 Oct 2024 05:12), Max: 99 (10 Oct 2024 02:13)  HR: 76 (10 Oct 2024 05:12) (55 - 763)  BP: 144/68 (10 Oct 2024 05:12) (121/76 - 167/84)  BP(mean): 100 (09 Oct 2024 13:00) (82 - 108)  RR: 18 (10 Oct 2024 05:12) (13 - 20)  SpO2: 97% (10 Oct 2024 05:12) (94% - 100%)    Parameters below as of 10 Oct 2024 05:12  Patient On (Oxygen Delivery Method): room air        Gen: No apparent distress    right knee :  Dressing C/D I  Ice wrap in place   BLE: motor intact EHL/FHL/TA/GS .  Sensation is grossly intact to light touch in the distal extremities.    Compartments are soft bilaterally.  Extremities are warm .  DP 2+ BLE     Labs: AM         A/P  s/p right Knee Arthroplasty, POD #1    - Pain control/ Analgesia  - DVT ppx ASA BID foot pumps  - PT/OT - wbat/oob    - Incentive Spirometer  - DC planning  - notify Ortho for questions 
Orthopedics Post-Op Check:  Patient was seen and examined at bedside. Denies CP/SOB/Dizziness/N/V/D/HA. Pain is well controlled at the moment.    Vital Signs Last 24 Hrs  T(C): 36.1 (09 Oct 2024 10:40), Max: 36.3 (09 Oct 2024 06:57)  T(F): 97 (09 Oct 2024 10:40), Max: 97.3 (09 Oct 2024 06:57)  HR: 763 (09 Oct 2024 11:00) (69 - 763)  BP: 134/73 (09 Oct 2024 11:00) (121/76 - 152/68)  BP(mean): 92 (09 Oct 2024 11:00) (82 - 92)  RR: 16 (09 Oct 2024 11:00) (16 - 20)  SpO2: 96% (09 Oct 2024 11:00) (96% - 99%)    Parameters below as of 09 Oct 2024 11:00  Patient On (Oxygen Delivery Method): room air    Physical Exam:  Gen: NAD  RLE:   Dressing C/D/I  Motor intact + EHL/FHL/TA/GS. Sensation is grossly intact.   Compartments are soft, extremities are warm, DP 2+

## 2024-10-11 VITALS
TEMPERATURE: 98 F | HEART RATE: 68 BPM | SYSTOLIC BLOOD PRESSURE: 142 MMHG | OXYGEN SATURATION: 100 % | RESPIRATION RATE: 18 BRPM | DIASTOLIC BLOOD PRESSURE: 65 MMHG

## 2024-10-23 ENCOUNTER — APPOINTMENT (OUTPATIENT)
Dept: ORTHOPEDIC SURGERY | Facility: CLINIC | Age: 79
End: 2024-10-23
Payer: MEDICARE

## 2024-10-23 VITALS — HEIGHT: 61 IN | WEIGHT: 110 LBS | BODY MASS INDEX: 20.77 KG/M2

## 2024-10-23 DIAGNOSIS — Z96.651 PRESENCE OF RIGHT ARTIFICIAL KNEE JOINT: ICD-10-CM

## 2024-10-23 PROBLEM — M19.90 UNSPECIFIED OSTEOARTHRITIS, UNSPECIFIED SITE: Chronic | Status: ACTIVE | Noted: 2024-10-02

## 2024-10-23 PROBLEM — M40.209 UNSPECIFIED KYPHOSIS, SITE UNSPECIFIED: Chronic | Status: ACTIVE | Noted: 2024-10-02

## 2024-10-23 PROCEDURE — 73562 X-RAY EXAM OF KNEE 3: CPT | Mod: RT

## 2024-10-23 PROCEDURE — 99024 POSTOP FOLLOW-UP VISIT: CPT

## 2024-10-23 RX ORDER — PANTOPRAZOLE 40 MG/1
TABLET, DELAYED RELEASE ORAL
Refills: 0 | Status: ACTIVE | COMMUNITY

## 2024-10-23 RX ORDER — NAPROXEN 500 MG
500 KIT ORAL
Refills: 0 | Status: ACTIVE | COMMUNITY

## 2025-01-23 ENCOUNTER — APPOINTMENT (OUTPATIENT)
Dept: ORTHOPEDIC SURGERY | Facility: CLINIC | Age: 80
End: 2025-01-23
Payer: MEDICARE

## 2025-01-23 VITALS — WEIGHT: 110 LBS | HEIGHT: 61 IN | BODY MASS INDEX: 20.77 KG/M2

## 2025-01-23 DIAGNOSIS — Z96.651 PRESENCE OF RIGHT ARTIFICIAL KNEE JOINT: ICD-10-CM

## 2025-01-23 PROCEDURE — 73562 X-RAY EXAM OF KNEE 3: CPT | Mod: RT

## 2025-01-23 PROCEDURE — 99213 OFFICE O/P EST LOW 20 MIN: CPT
